# Patient Record
Sex: MALE | Race: OTHER | NOT HISPANIC OR LATINO | URBAN - METROPOLITAN AREA
[De-identification: names, ages, dates, MRNs, and addresses within clinical notes are randomized per-mention and may not be internally consistent; named-entity substitution may affect disease eponyms.]

---

## 2017-05-10 ENCOUNTER — INPATIENT (INPATIENT)
Facility: HOSPITAL | Age: 52
LOS: 0 days | Discharge: LEFT BEFORE DISCHARGE | DRG: 640 | End: 2017-05-11
Payer: MEDICARE

## 2017-05-10 VITALS
SYSTOLIC BLOOD PRESSURE: 108 MMHG | DIASTOLIC BLOOD PRESSURE: 66 MMHG | HEART RATE: 107 BPM | OXYGEN SATURATION: 97 % | TEMPERATURE: 98 F | RESPIRATION RATE: 18 BRPM

## 2017-05-10 DIAGNOSIS — N19 UNSPECIFIED KIDNEY FAILURE: ICD-10-CM

## 2017-05-10 DIAGNOSIS — E11.9 TYPE 2 DIABETES MELLITUS WITHOUT COMPLICATIONS: ICD-10-CM

## 2017-05-10 DIAGNOSIS — R53.1 WEAKNESS: ICD-10-CM

## 2017-05-10 DIAGNOSIS — E87.1 HYPO-OSMOLALITY AND HYPONATREMIA: ICD-10-CM

## 2017-05-10 DIAGNOSIS — K72.90 HEPATIC FAILURE, UNSPECIFIED WITHOUT COMA: ICD-10-CM

## 2017-05-10 DIAGNOSIS — E87.5 HYPERKALEMIA: ICD-10-CM

## 2017-05-10 DIAGNOSIS — E87.2 ACIDOSIS: ICD-10-CM

## 2017-05-10 DIAGNOSIS — N17.9 ACUTE KIDNEY FAILURE, UNSPECIFIED: ICD-10-CM

## 2017-05-10 DIAGNOSIS — R79.89 OTHER SPECIFIED ABNORMAL FINDINGS OF BLOOD CHEMISTRY: ICD-10-CM

## 2017-05-10 LAB
ALBUMIN SERPL ELPH-MCNC: 1.9 G/DL — LOW (ref 3.4–5)
ALP SERPL-CCNC: 206 U/L — HIGH (ref 40–120)
ALP SERPL-CCNC: 208 U/L — HIGH (ref 40–120)
ALP SERPL-CCNC: 215 U/L — HIGH (ref 40–120)
ALT FLD-CCNC: 45 U/L — HIGH (ref 12–42)
ALT FLD-CCNC: 46 U/L — HIGH (ref 12–42)
ALT FLD-CCNC: 50 U/L — HIGH (ref 12–42)
AMMONIA BLD-MCNC: 18 UMOL/L — SIGNIFICANT CHANGE UP (ref 10–32)
AMMONIA BLD-MCNC: 63 UMOL/L — HIGH (ref 10–32)
ANION GAP SERPL CALC-SCNC: 10 MMOL/L — SIGNIFICANT CHANGE UP (ref 9–16)
ANION GAP SERPL CALC-SCNC: 12 MMOL/L — SIGNIFICANT CHANGE UP (ref 9–16)
ANION GAP SERPL CALC-SCNC: 12 MMOL/L — SIGNIFICANT CHANGE UP (ref 9–16)
ANISOCYTOSIS BLD QL: SLIGHT — SIGNIFICANT CHANGE UP
APTT BLD: 44.8 SEC — HIGH (ref 27.5–37.4)
APTT BLD: 48 SEC — HIGH (ref 27.5–37.4)
AST SERPL-CCNC: 177 U/L — HIGH (ref 15–37)
AST SERPL-CCNC: 179 U/L — HIGH (ref 15–37)
AST SERPL-CCNC: 201 U/L — HIGH (ref 15–37)
BASOPHILS NFR BLD AUTO: 0.2 % — SIGNIFICANT CHANGE UP (ref 0–2)
BILIRUB SERPL-MCNC: 13.9 MG/DL — HIGH (ref 0.2–1.2)
BILIRUB SERPL-MCNC: 14.2 MG/DL — HIGH (ref 0.2–1.2)
BILIRUB SERPL-MCNC: 14.9 MG/DL — HIGH (ref 0.2–1.2)
BLD GP AB SCN SERPL QL: NEGATIVE — SIGNIFICANT CHANGE UP
BUN SERPL-MCNC: 90 MG/DL — HIGH (ref 7–23)
BUN SERPL-MCNC: 91 MG/DL — HIGH (ref 7–23)
BUN SERPL-MCNC: 93 MG/DL — HIGH (ref 7–23)
BUN SERPL-MCNC: 95 MG/DL — HIGH (ref 7–23)
CALCIUM SERPL-MCNC: 7.2 MG/DL — LOW (ref 8.5–10.5)
CALCIUM SERPL-MCNC: 7.3 MG/DL — LOW (ref 8.5–10.5)
CALCIUM SERPL-MCNC: 7.4 MG/DL — LOW (ref 8.5–10.5)
CALCIUM SERPL-MCNC: 7.7 MG/DL — LOW (ref 8.5–10.5)
CHLORIDE SERPL-SCNC: 109 MMOL/L — HIGH (ref 96–108)
CHLORIDE SERPL-SCNC: 109 MMOL/L — HIGH (ref 96–108)
CHLORIDE SERPL-SCNC: 110 MMOL/L — HIGH (ref 96–108)
CHLORIDE SERPL-SCNC: 110 MMOL/L — HIGH (ref 96–108)
CK MB CFR SERPL CALC: 4 NG/ML — HIGH (ref 0.5–3.6)
CK SERPL-CCNC: 150 U/L — SIGNIFICANT CHANGE UP (ref 39–308)
CO2 SERPL-SCNC: 10 MMOL/L — CRITICAL LOW (ref 22–31)
CO2 SERPL-SCNC: 10 MMOL/L — CRITICAL LOW (ref 22–31)
CO2 SERPL-SCNC: 12 MMOL/L — LOW (ref 22–31)
CO2 SERPL-SCNC: SIGNIFICANT CHANGE UP MMOL/L (ref 22–31)
CREAT SERPL-MCNC: 2.56 MG/DL — HIGH (ref 0.5–1.3)
CREAT SERPL-MCNC: 2.56 MG/DL — HIGH (ref 0.5–1.3)
CREAT SERPL-MCNC: 2.57 MG/DL — HIGH (ref 0.5–1.3)
CREAT SERPL-MCNC: 2.59 MG/DL — HIGH (ref 0.5–1.3)
EOSINOPHIL NFR BLD AUTO: 1.7 % — SIGNIFICANT CHANGE UP (ref 0–6)
EOSINOPHIL NFR BLD AUTO: 2 % — SIGNIFICANT CHANGE UP (ref 0–6)
ETHANOL SERPL-MCNC: <3 MG/DL — SIGNIFICANT CHANGE UP
GAS PNL BLDV: SIGNIFICANT CHANGE UP
GLUCOSE SERPL-MCNC: 114 MG/DL — HIGH (ref 70–99)
GLUCOSE SERPL-MCNC: 207 MG/DL — HIGH (ref 70–99)
GLUCOSE SERPL-MCNC: 213 MG/DL — HIGH (ref 70–99)
GLUCOSE SERPL-MCNC: 228 MG/DL — HIGH (ref 70–99)
HBV SURFACE AG SER-ACNC: SIGNIFICANT CHANGE UP
HCT VFR BLD CALC: 24.1 % — LOW (ref 39–50)
HCT VFR BLD CALC: 24.2 % — LOW (ref 39–50)
HCV AB S/CO SERPL IA: 13.97 S/CO — SIGNIFICANT CHANGE UP
HCV AB SERPL-IMP: REACTIVE
HGB BLD-MCNC: 8.3 G/DL — LOW (ref 13–17)
HGB BLD-MCNC: 8.3 G/DL — LOW (ref 13–17)
HYPOCHROMIA BLD QL: SLIGHT — SIGNIFICANT CHANGE UP
INR BLD: 2.89 — HIGH (ref 0.88–1.16)
INR BLD: 2.99 — HIGH (ref 0.88–1.16)
LACTATE SERPL-SCNC: 3.1 MMOL/L — HIGH (ref 0.5–2)
LYMPHOCYTES # BLD AUTO: 7 % — LOW (ref 13–44)
LYMPHOCYTES # BLD AUTO: 7 % — LOW (ref 13–44)
MACROCYTES BLD QL: SIGNIFICANT CHANGE UP
MAGNESIUM SERPL-MCNC: 2.1 MG/DL — SIGNIFICANT CHANGE UP (ref 1.6–2.4)
MANUAL DIF COMMENT BLD-IMP: SIGNIFICANT CHANGE UP
MANUAL SMEAR VERIFICATION: SIGNIFICANT CHANGE UP
MCHC RBC-ENTMCNC: 31.1 PG — SIGNIFICANT CHANGE UP (ref 27–34)
MCHC RBC-ENTMCNC: 31.1 PG — SIGNIFICANT CHANGE UP (ref 27–34)
MCHC RBC-ENTMCNC: 34.3 G/DL — SIGNIFICANT CHANGE UP (ref 32–36)
MCHC RBC-ENTMCNC: 34.4 G/DL — SIGNIFICANT CHANGE UP (ref 32–36)
MCV RBC AUTO: 90.3 FL — SIGNIFICANT CHANGE UP (ref 80–100)
MCV RBC AUTO: 90.6 FL — SIGNIFICANT CHANGE UP (ref 80–100)
MONOCYTES NFR BLD AUTO: 12 % — SIGNIFICANT CHANGE UP (ref 2–14)
MONOCYTES NFR BLD AUTO: 12.4 % — SIGNIFICANT CHANGE UP (ref 2–14)
NEUTROPHILS NFR BLD AUTO: 72 % — SIGNIFICANT CHANGE UP (ref 43–77)
NEUTROPHILS NFR BLD AUTO: 72 % — SIGNIFICANT CHANGE UP (ref 43–77)
NEUTROPHILS NFR BLD AUTO: 78.7 % — HIGH (ref 43–77)
NEUTS BAND # BLD: 7 % — SIGNIFICANT CHANGE UP
NT-PROBNP SERPL-SCNC: 861 PG/ML — HIGH
OVALOCYTES BLD QL SMEAR: SLIGHT — SIGNIFICANT CHANGE UP
PLAT MORPH BLD: (no result)
PLATELET # BLD AUTO: 123 K/UL — LOW (ref 150–400)
PLATELET # BLD AUTO: 124 K/UL — LOW (ref 150–400)
POIKILOCYTOSIS BLD QL AUTO: SLIGHT — SIGNIFICANT CHANGE UP
POLYCHROMASIA BLD QL SMEAR: SLIGHT — SIGNIFICANT CHANGE UP
POTASSIUM SERPL-MCNC: 7.1 MMOL/L — CRITICAL HIGH (ref 3.5–5.3)
POTASSIUM SERPL-MCNC: 7.2 MMOL/L — CRITICAL HIGH (ref 3.5–5.3)
POTASSIUM SERPL-MCNC: 7.4 MMOL/L — CRITICAL HIGH (ref 3.5–5.3)
POTASSIUM SERPL-MCNC: SIGNIFICANT CHANGE UP MMOL/L (ref 3.5–5.3)
POTASSIUM SERPL-SCNC: 7.1 MMOL/L — CRITICAL HIGH (ref 3.5–5.3)
POTASSIUM SERPL-SCNC: 7.2 MMOL/L — CRITICAL HIGH (ref 3.5–5.3)
POTASSIUM SERPL-SCNC: 7.4 MMOL/L — CRITICAL HIGH (ref 3.5–5.3)
POTASSIUM SERPL-SCNC: SIGNIFICANT CHANGE UP MMOL/L (ref 3.5–5.3)
PROT SERPL-MCNC: 5.5 G/DL — LOW (ref 6.4–8.2)
PROT SERPL-MCNC: 5.7 G/DL — LOW (ref 6.4–8.2)
PROT SERPL-MCNC: 6 G/DL — LOW (ref 6.4–8.2)
PROTHROM AB SERPL-ACNC: 32.8 SEC — HIGH (ref 9.8–12.7)
PROTHROM AB SERPL-ACNC: 33.9 SEC — HIGH (ref 9.8–12.7)
RBC # BLD: 2.67 M/UL — LOW (ref 4.2–5.8)
RBC # BLD: 2.67 M/UL — LOW (ref 4.2–5.8)
RBC # FLD: 21.3 % — HIGH (ref 10.3–16.9)
RBC # FLD: 21.7 % — HIGH (ref 10.3–16.9)
RBC BLD AUTO: (no result)
RH IG SCN BLD-IMP: POSITIVE — SIGNIFICANT CHANGE UP
SCHISTOCYTES BLD QL AUTO: SIGNIFICANT CHANGE UP
SODIUM SERPL-SCNC: 128 MMOL/L — LOW (ref 135–145)
SODIUM SERPL-SCNC: 131 MMOL/L — LOW (ref 135–145)
SODIUM SERPL-SCNC: 132 MMOL/L — LOW (ref 135–145)
SODIUM SERPL-SCNC: 132 MMOL/L — LOW (ref 135–145)
SPHEROCYTES BLD QL SMEAR: SLIGHT — SIGNIFICANT CHANGE UP
TROPONIN I SERPL-MCNC: <0.015 NG/ML — SIGNIFICANT CHANGE UP (ref 0.01–0.04)
WBC # BLD: 16 K/UL — HIGH (ref 3.8–10.5)
WBC # BLD: 16.7 K/UL — HIGH (ref 3.8–10.5)
WBC # FLD AUTO: 16 K/UL — HIGH (ref 3.8–10.5)
WBC # FLD AUTO: 16.7 K/UL — HIGH (ref 3.8–10.5)

## 2017-05-10 PROCEDURE — 71010: CPT | Mod: 26,GV

## 2017-05-10 PROCEDURE — 99291 CRITICAL CARE FIRST HOUR: CPT | Mod: 25

## 2017-05-10 PROCEDURE — 93010 ELECTROCARDIOGRAM REPORT: CPT

## 2017-05-10 RX ORDER — CALCIUM GLUCONATE 100 MG/ML
1 VIAL (ML) INTRAVENOUS ONCE
Qty: 0 | Refills: 0 | Status: COMPLETED | OUTPATIENT
Start: 2017-05-10 | End: 2017-05-10

## 2017-05-10 RX ORDER — ALBUTEROL 90 UG/1
2.5 AEROSOL, METERED ORAL ONCE
Qty: 0 | Refills: 0 | Status: COMPLETED | OUTPATIENT
Start: 2017-05-10 | End: 2017-05-11

## 2017-05-10 RX ORDER — DEXTROSE 50 % IN WATER 50 %
50 SYRINGE (ML) INTRAVENOUS ONCE
Qty: 0 | Refills: 0 | Status: COMPLETED | OUTPATIENT
Start: 2017-05-10 | End: 2017-05-10

## 2017-05-10 RX ORDER — LACTULOSE 10 G/15ML
15 SOLUTION ORAL
Qty: 0 | Refills: 0 | Status: DISCONTINUED | OUTPATIENT
Start: 2017-05-10 | End: 2017-05-11

## 2017-05-10 RX ORDER — INSULIN HUMAN 100 [IU]/ML
10 INJECTION, SOLUTION SUBCUTANEOUS ONCE
Qty: 0 | Refills: 0 | Status: COMPLETED | OUTPATIENT
Start: 2017-05-10 | End: 2017-05-10

## 2017-05-10 RX ORDER — SODIUM CHLORIDE 9 MG/ML
1000 INJECTION, SOLUTION INTRAVENOUS
Qty: 0 | Refills: 0 | Status: DISCONTINUED | OUTPATIENT
Start: 2017-05-10 | End: 2017-05-10

## 2017-05-10 RX ORDER — FUROSEMIDE 40 MG
40 TABLET ORAL ONCE
Qty: 0 | Refills: 0 | Status: COMPLETED | OUTPATIENT
Start: 2017-05-10 | End: 2017-05-10

## 2017-05-10 RX ORDER — SODIUM CHLORIDE 9 MG/ML
500 INJECTION INTRAMUSCULAR; INTRAVENOUS; SUBCUTANEOUS ONCE
Qty: 0 | Refills: 0 | Status: COMPLETED | OUTPATIENT
Start: 2017-05-10 | End: 2017-05-10

## 2017-05-10 RX ORDER — SODIUM CHLORIDE 9 MG/ML
1000 INJECTION, SOLUTION INTRAVENOUS
Qty: 0 | Refills: 0 | Status: DISCONTINUED | OUTPATIENT
Start: 2017-05-10 | End: 2017-05-11

## 2017-05-10 RX ORDER — SODIUM POLYSTYRENE SULFONATE 4.1 MEQ/G
30 POWDER, FOR SUSPENSION ORAL ONCE
Qty: 0 | Refills: 0 | Status: COMPLETED | OUTPATIENT
Start: 2017-05-10 | End: 2017-05-10

## 2017-05-10 RX ORDER — ALBUTEROL 90 UG/1
2.5 AEROSOL, METERED ORAL
Qty: 0 | Refills: 0 | Status: COMPLETED | OUTPATIENT
Start: 2017-05-10 | End: 2017-05-10

## 2017-05-10 RX ORDER — SODIUM CHLORIDE 9 MG/ML
500 INJECTION, SOLUTION INTRAVENOUS
Qty: 0 | Refills: 0 | Status: COMPLETED | OUTPATIENT
Start: 2017-05-10 | End: 2017-05-11

## 2017-05-10 RX ADMIN — SODIUM CHLORIDE 500 MILLILITER(S): 9 INJECTION INTRAMUSCULAR; INTRAVENOUS; SUBCUTANEOUS at 17:48

## 2017-05-10 RX ADMIN — Medication 50 MILLILITER(S): at 18:26

## 2017-05-10 RX ADMIN — SODIUM POLYSTYRENE SULFONATE 30 GRAM(S): 4.1 POWDER, FOR SUSPENSION ORAL at 18:50

## 2017-05-10 RX ADMIN — SODIUM POLYSTYRENE SULFONATE 30 GRAM(S): 4.1 POWDER, FOR SUSPENSION ORAL at 22:20

## 2017-05-10 RX ADMIN — INSULIN HUMAN 10 UNIT(S): 100 INJECTION, SOLUTION SUBCUTANEOUS at 18:26

## 2017-05-10 RX ADMIN — ALBUTEROL 2.5 MILLIGRAM(S): 90 AEROSOL, METERED ORAL at 18:26

## 2017-05-10 RX ADMIN — INSULIN HUMAN 10 UNIT(S): 100 INJECTION, SOLUTION SUBCUTANEOUS at 23:19

## 2017-05-10 RX ADMIN — LACTULOSE 15 GRAM(S): 10 SOLUTION ORAL at 23:19

## 2017-05-10 RX ADMIN — Medication 200 GRAM(S): at 22:30

## 2017-05-10 RX ADMIN — Medication 50 MILLILITER(S): at 23:21

## 2017-05-10 RX ADMIN — ALBUTEROL 2.5 MILLIGRAM(S): 90 AEROSOL, METERED ORAL at 18:32

## 2017-05-10 NOTE — H&P ADULT - PROBLEM SELECTOR PROBLEM 6
Type 2 diabetes mellitus with complication, with long-term current use of insulin Anemia, unspecified type Bright red blood per rectum

## 2017-05-10 NOTE — H&P ADULT - PROBLEM SELECTOR PROBLEM 7
Weakness Type 2 diabetes mellitus with complication, with long-term current use of insulin Anemia, unspecified type

## 2017-05-10 NOTE — CONSULT NOTE ADULT - PROBLEM SELECTOR RECOMMENDATION 6
Unclear Baseline Creatinine. Patient Reports Normal Urinary Frequency and Amount. Unclear Whether Or Not Patient with Hepatorenal Syndrome At This Time.   -Check Urine Creatinine, BUN, Na.   -Monitor BUN/Creatinine Closely.   -Monitor Strict I/Os.  -Obtain Collateral Information from Physicians in [place].

## 2017-05-10 NOTE — H&P ADULT - HISTORY OF PRESENT ILLNESS
51M PMHx of hepatitis C cirrhosis, 51M PMHx of hepatitis C cirrhosis s/p TIPS 1 month ago (s/p Hep C treatment), HCC, DMII recently arranged to be home with hospice care presents to [facility] after collapse. Patient travelled to [place] to renew his visa and while walking in the street he started feeling very weak, dizzy, light headed and had to fall to the ground. No loss of consciousness. seizure activity, palpitations, chest pain. He had been in his usual state of health prior to the episode and denies any recent illness, fever/chills, URI symptoms, medication changes. Takes lactulose daily with daily BMs. Denies any known history of kidney disease.     Patient repeatedly asking to go home to [place] where his family lives. Has been on home hospice for 1 week and understands these implications. Would like to be treated fully here in order to allow him to get back home. Reluctant to dialysis, but amenable if he fails medical management     In the ED: Vitals T 98, -107, -127/51-57, RR 16-18, Sat 97-99. Found to be hyperkalemic and given albuterol neb, insulin 10 units + D50%, kayexalate. ICU was consulted and patient admitted to [facility] 51M PMHx of hepatitis C cirrhosis s/p TIPS 1 month ago (s/p Hep C treatment), HCC, DMII, AS s/p TAVR recently arranged to be home with hospice care presents to [facility] after collapse. Patient travelled to [place] to renew his visa and while walking in the street he started feeling very weak, dizzy, light headed and had to fall to the ground. No loss of consciousness. seizure activity, palpitations, chest pain. He had been in his usual state of health prior to the episode and denies any recent illness, fever/chills, URI symptoms, medication changes. Takes lactulose daily with daily BMs. Denies any known history of kidney disease.     Patient repeatedly asking to go home to [place] where his family lives. Has been on home hospice for 1 week and understands these implications. Would like to be treated fully here in order to allow him to get back home. Reluctant to dialysis, but amenable if he fails medical management     In the ED: Vitals T 98, -107, -127/51-57, RR 16-18, Sat 97-99. Found to be hyperkalemic and given albuterol neb, insulin 10 units + D50%, kayexalate. ICU was consulted and patient admitted to [facility]

## 2017-05-10 NOTE — ED PROVIDER NOTE - OBJECTIVE STATEMENT
51y male h/o liver disease (not sure why, states "in [place], everyone has this", on list for transplant)/cirrhosis on lactulose, DM, hernia, states [place] when he felt short of breath and dizzy, so he went to the ground. States he usually does not walk excessively like today bc this happens to him. No LOC/syncope. No fever, chills, cough, abd pain, dysuria. PMD [facility] in [place]. No EtOH use.

## 2017-05-10 NOTE — H&P ADULT - PROBLEM SELECTOR PLAN 1
K 7.4 on presentation K 7.4 on presentation likely secondary to acute renal failure and acidosis. Possible component of hemolysis. S/p insulin, albuterol, PO Kayexalate in ED. EKG changes with ME prolongation, but no QT changes.  - Renal consulted for possible emergent dialysis and help with K management  - Lasix 40mg IV and continue with lasix if patient is making adequate urine  - Not tolerating kayexalate PO with N/V, so will give Kayexalate Enema  - Trend BMP and EKG  - If lasix and enema do not have effect, will discuss dialysis with nephrology

## 2017-05-10 NOTE — ED PROVIDER NOTE - MEDICAL DECISION MAKING DETAILS
51y male with pre-syncope episode which he relates to overexertion. Currently feels well and has no complaints. Visibly jaundice on exam. 51y male with pre-syncope episode which he relates to overexertion. Currently feels well and has no complaints. Visibly jaundice on exam. No evidence for SBP or encephalopathy at this time. Plan for labs, EKG, reassess and admit.

## 2017-05-10 NOTE — ED PROVIDER NOTE - PROGRESS NOTE DETAILS
Labs noted and resent. Second set reviewed. K 7.4 (not hemolyzed), renal insuff, abnormal LFTs (unknown baseline). Hyper-K meds ordered, abd US ordered, d/w ICU for consult. Labs noted and resent. Second set reviewed. K 7.4 (not hemolyzed), renal insuff, abnormal LFTs (unknown baseline). Hyper-K meds ordered, levaquin ordered 2/2 CXR read, abd US ordered, d/w ICU for consult. Pt seen and evaluated by ICU team - recommend admission to stepdown unit.

## 2017-05-10 NOTE — ED PROVIDER NOTE - CRITICAL CARE PROVIDED
consultation with other physicians/direct patient care (not related to procedure)/documentation/additional history taking

## 2017-05-10 NOTE — H&P ADULT - PROBLEM SELECTOR PLAN 3
Unclear cause of ARF. Sepsis vs. hepatorenal syndrome. Unknown baseline Cr, but patient denies history of kidney problems.  - Renal consulted, f/u recs  - BUN 95, Cr 2.57 unknown baseline  - Check urine Na, urea, Cr  - f/u Urinalysis  - responding appropriately to lasix

## 2017-05-10 NOTE — H&P ADULT - NSHPPHYSICALEXAM_GEN_ALL_CORE
.  VITAL SIGNS:  T(C): 36.3, Max: 36.7 (05-10 @ 15:14)  T(F): 97.4, Max: 98 (05-10 @ 15:14)  HR: 104 (100 - 107)  BP: 124/58 (105/51 - 127/57)  BP(mean): 93 (82 - 93)  RR: 17 (16 - 18)  SpO2: 100% (97% - 100%)  Wt(kg): --    PHYSICAL EXAM:    Constitutional: appears nauseous, jaundice, distended  Head: NC/AT  Eyes: PERRL, EOMI, scleral icterus  ENT: no nasal discharge; uvula midline, no oropharyngeal erythema or exudates; MMM  Neck: supple; no JVD or thyromegaly  Respiratory: decreased BS with crackles at the bases b/l without wheezes  Cardiac: +S1/S2; tachycardic, harsh systolic murmur at R sternal border  Gastrointestinal: significant distention, dull to percussion, minimal TTP, abdominal hernias  Back: spine midline, no bony tenderness or step-offs; no CVAT B/L  Extremities: cool extremities, 3+ pitting edema with skin thinning/rippling  Musculoskeletal: NROM x4; no joint swelling, tenderness or erythema  Vascular: 2+ radial, femoral, DP/PT pulses B/L  Dermatologic: skin warm, dry and intact; no rashes, wounds, or scars. Jaundice  Lymphatic: no submandibular or cervical LAD  Neurologic: AAOx3; CNII-XII grossly intact; no focal deficits  Psychiatric: affect and characteristics of appearance, verbalizations, behaviors are appropriate

## 2017-05-10 NOTE — CONSULT NOTE ADULT - PROBLEM SELECTOR RECOMMENDATION 4
Hyponatremia likely Hypotonic Hypervolemic Hyponatremia in Setting of Cirrhosis.   -Check Serum Osm, Urine Osm, and Urina Na.   -Monitor Na Levels.

## 2017-05-10 NOTE — H&P ADULT - PROBLEM SELECTOR PLAN 5
HCO3 10 with elevated lactic acid, BUN and chloride levels concerning for mixed AGMA/NAGMA. May be component of RTA as well.   - HCO3 gtt 200cc/hr  - f/u UA and urine pH  - need to correct ARF to lower BUN and Cl levels. Lactic acid will clear slowly.

## 2017-05-10 NOTE — H&P ADULT - PROBLEM SELECTOR PLAN 4
Na 132, INR 2.99, Albumin 1.9, Tbili 13.9 --> MELD score of 38. Decreased synthetic function with sequelae of thrombocytopenia, coagulopathy, anasarca. Patient home with hospice care. Previously on transplant list.  - C/w lactulose daily  - hold rifaximin while treating with IV Abx  - no aldactone in setting of hyperkalemia, lasix as above

## 2017-05-10 NOTE — CONSULT NOTE ADULT - PROBLEM SELECTOR RECOMMENDATION 9
Patient reports Dizziness, Weakness, Reporting Bringing Self To Ground. EMS Called, and Brought To [facility]. Patient on Home Hospice for Liver Failure due to Hepatitis C Cirrhosis and Hepatocellular Carcinoma. Etiology of Weakness Likely Related to Progression of Underlying Liver Disease. However, Also, with Leukocytosis, Tachycardia, Lactic Acidosis and Possible Sources of Infection, So Also Potentially with Severe Sepsis.   -As per HPI, No Symptoms To Localize Source of Infection. However, with Possible Right Lower Lobe Infiltrate on CXR. Given Levofloxacin 750mg IV x1 in the ED. Patient Recently Had TIPS Procedure, Reporting 1 Month Ago, So Has Been Hospitalized. Would Start Vancomycin and Zosyn (Renally Dosed) To Cover for HAP.   -In Addition, Zosyn Would Cover for SBP, Although Patient Not Exhibiting Signs or Symptoms of SBP.   -No Urinary Symptoms Report, Can Check UA and if Positive Send for Urine Culture.   -Weakness More Likely Related To Progression of Liver Disease.   -Would Check Ammonia Level, Restart Lactulose and Rifaximin. Titrate Lactulose to 3-4 Bowel Movements Daily.
Patient is a 51 year old male with acute renal failure. Could be pre-renal in nature due to renal hypoperfusion/dehydration. Could also be due to hepatorenal syndrome (unknown baseline renal function). Could also be due to Acute GN though less likely on the differential. Would also recommend to rule out obstructive nephropathy.    P - please check UA and urine lytes   Please monitor strict I/Os   Please get an ultrasound retroperitoneal with pre-post void bladder views

## 2017-05-10 NOTE — ED PROVIDER NOTE - CARE PLAN
Principal Discharge DX:	Hyperkalemia  Secondary Diagnosis:	Cirrhosis of liver with ascites, unspecified hepatic cirrhosis type  Secondary Diagnosis:	Renal insufficiency

## 2017-05-10 NOTE — ED ADULT NURSE NOTE - CHIEF COMPLAINT QUOTE
patient reports dizziness, shortness of breath and chest discomfort. Patient's abdomen appears distended.

## 2017-05-10 NOTE — PROVIDER CONTACT NOTE (CRITICAL VALUE NOTIFICATION) - TEST AND RESULT REPORTED:
ph 7.13, CO2 23, O2 134, Bicarb 8, Base excess -19.7, Na 126, K 7.0, Ionized calcium 1.09
K 7.2 CO2- 10

## 2017-05-10 NOTE — H&P ADULT - NSHPREVIEWOFSYSTEMS_GEN_ALL_CORE
Negative for fever/chills, diarrhea/constipation, headache, vision/hearing changes, sore throat, dyspnea, chest pain, dysuria   Positive for nausea/vomiting, dizziness, abdominal distention, LE edema

## 2017-05-10 NOTE — CONSULT NOTE ADULT - SUBJECTIVE AND OBJECTIVE BOX
Patient is a 51y Male for whom nephrology was consulted for acute renal failure, hyperkalemia, and metabolic acidosis. Patient states he is visiting from [place] where he lives with his family. Patient sates he was in [place] to renew his Visa at the [employer]. While he was there, he felt light headed and lowered himself to the ground. Patient denies any fevers, chills, vomiting, changes in appetite, headaches, Patient denies any abdominal pain but does have a distended abdomen which has not worsened. Patient has a history of Hepatitis C and liver cirrhosis and hepatocellular carcinoma. Patient underwent TIPS one month ago. Patient was recently placed on home hospice, however, patient states that now he wants to be full code and have "everything done."    PAST MEDICAL & SURGICAL HISTORY:  Hernia  Cirrhosis  Hepatitis C  DM type 2 (diabetes mellitus, type 2)    MEDICATIONS  (STANDING):  dextrose 50% Injectable 50milliLiter(s) IV Push Once  insulin regular  human recombinant. 10Unit(s) IV Push once  ALBUTerol    0.083%. 2.5milliGRAM(s) Nebulizer once  lactulose Syrup 15Gram(s) Oral two times a day  dextrose 5% 1000milliLiter(s) IV Continuous <Continuous>    MEDICATIONS  (PRN):    Allergies    No Known Allergies    Intolerances    SOCIAL HISTORY: Never smoker, denies alcohol use, denies illicit drug use    T(C): , Max: 36.7 (05-10 @ 15:14)  T(F): , Max: 98 (05-10 @ 15:14)  HR: 106  BP: 119/52  BP(mean): 82  RR: 18  SpO2: 100%    LABS:                        8.3    16.0  )-----------( 123      ( 10 May 2017 17:05 )             24.2     05-10    132<L>  |  110<H>  |  95<H>  ----------------------------<  114<H>  7.1<HH>   |  10<LL>  |  2.57<H>    Ca    7.3<L>      10 May 2017 21:21  Mg     2.1     05-10    TPro  5.5<L>  /  Alb  1.9<L>  /  TBili  13.9<H>  /  DBili  x   /  AST  177<H>  /  ALT  45<H>  /  AlkPhos  208<H>  05-10    Hepatitis C Virus S/CO Ratio: 13.97 S/CO (05-10 @ 17:05)  Hepatitis C Virus Interpretation: Reactive <!!> (05-10 @ 17:05)    PT/INR - ( 10 May 2017 17:05 )   PT: 33.9 sec;   INR: 2.99       PTT - ( 10 May 2017 17:05 )  PTT:48.0 sec

## 2017-05-10 NOTE — H&P ADULT - ASSESSMENT
51M PMHx of hepatitis C cirrhosis s/p TIPS 1 month ago (s/p Hep C treatment), HCC, DMII recently arranged to be home with hospice care presents to [facility] after collapse. Concern for decompensated liver failure, acute renal failure, hyperkalemia. Likely all secondary to worsening HCC and cirrhosis vs. infection. 51M PMHx of hepatitis C cirrhosis s/p TIPS 1 month ago (s/p Hep C treatment), HCC, DMII, AS s/p TAVR recently arranged to be home with hospice care presents to [facility] after collapse. Concern for decompensated liver failure, acute renal failure, hyperkalemia. Likely all secondary to worsening HCC and cirrhosis vs. infection.

## 2017-05-10 NOTE — H&P ADULT - PROBLEM SELECTOR PLAN 7
Patient on long acting and premeal insulin at home. Finger sticks well controlled currently.  - ISS while patient has N/V and decreased PO intake Likely ACD, unknown baseline. Concern for hemolysis with hyperkalemia.  - check Fe studies, B12, folate in AM  - low haptoglobin, elevated LDH and bilirubin concerning for hemolysis. Occasional schistocytes on manual smear. Concern for DIC in setting of hepatic failure.   - hemorrhoidal bleed may be contributing  - maintain active T+S

## 2017-05-10 NOTE — CONSULT NOTE ADULT - PROBLEM SELECTOR RECOMMENDATION 5
Metabolic Acidosis Likely Related to Lactic Acidosis, and Renal Failure.   -Unclear Etiology of Lactic Acidosis, Patient Warm, Well Perfused, Vital Signs Stable, Possibly Unable to Clear Effectively in Setting of Liver Failure.   -Unclear Baseline Creatinine.

## 2017-05-10 NOTE — CONSULT NOTE ADULT - PROBLEM SELECTOR RECOMMENDATION 8
Blood Glucose Elevated, Patient on Insulin At Home.   -Start Insulin Lispro Medium Dose Correctional Scale Before Meals and at Bedtime.   -Consider Restarting Home Levemir and Pre-Meal Novolog.   -Monitor Blood Glucose and Adjust Accordingly.     Disposition: [facility]  CODE STATUS: FULL CODE, Despite Being on Home Hospice, Understanding What Hospice Is, and Acknowledging That He Wants to Go Home to [place] to Live Out What Time He Has Left with his Family.     Discussed with Critical Care Attending.

## 2017-05-10 NOTE — H&P ADULT - PROBLEM SELECTOR PROBLEM 8
Prophylactic measure Type 2 diabetes mellitus with complication, with long-term current use of insulin

## 2017-05-10 NOTE — CONSULT NOTE ADULT - PROBLEM SELECTOR RECOMMENDATION 7
Patient with Elevated Transaminases, Bilirubin. Unknown Baseline. Patient reports Persistent Jaundice Unchanged, So Likely Bilirubin Has Been Elevated. Likely Secondary to Compression of Small Intrahepatic Bile Ducts from Patient Reports Growing Hepatocellular Carcinoma. Also with Decreased Albumin, Elevated PT/INR, PTT.   -Obtain Collateral Information from Physicians in [place].

## 2017-05-10 NOTE — H&P ADULT - PROBLEM SELECTOR PLAN 8
hold heparin in setting of thrombocytopenia Patient on long acting and premeal insulin at home. Finger sticks well controlled currently.  - ISS while patient has N/V and decreased PO intake

## 2017-05-10 NOTE — H&P ADULT - PROBLEM SELECTOR PLAN 9
F: D5 3 amps HCO3 at 200cc/hr  E: as above  N: renal restricted diet hold heparin in setting of thrombocytopenia

## 2017-05-10 NOTE — H&P ADULT - PROBLEM SELECTOR PLAN 6
Likely ACD, unknown baseline. Concern for hemolysis with hyperkalemia.  - check Fe studies, B12, folate in AM  - low haptoglobin, elevated LDH and bilirubin concerning for hemolysis. Occasional schistocytes on manual smear. Concern for DIC in setting of hepatic failure.   - maintain active T+S Patient had BM with brown stool with 2-3 tablespoons of BRB likely secondary to hemorrhoids from cirrhosis. Does not appear to be significant source of bleed  - continue to monitor  - consider GI consult if continues

## 2017-05-10 NOTE — CONSULT NOTE ADULT - PROBLEM SELECTOR RECOMMENDATION 3
Potassium 7.4, Repeated 7.2. Patient received Kayexalate, Regular Insulin, D50, and Albuterol Nebulizer in ED.   -Repeat Potassium, Attempt To Bring Potassium Into Normal Range.   -Repeat EKG in AM.
Patient noted to have non-anion gap metabolic acidosis.     P - give bolus of 500 cc D5W with 150 mEq of sodium bicarbonate and continue drip at 200 cc/hr   Monitor electrolytes closely   Consider ABG to better assess acid-base status

## 2017-05-10 NOTE — H&P ADULT - PROBLEM SELECTOR PLAN 2
Acute onset of weakness with leukocytosis, tachycardia, renal failure, lactic acidosis concerning for sepsis. Possible PNA on CXR. Abdominal exam not consistent with SBP and on rifaximin at home. Will cover broadly.  - Vancomycin 1g and check level in 24hrs  - Zosyn 2.25g q6h for renally dosed coverage of HCAP  - Anasarca limiting fluid resucistation, on D5 w/ 3 amps Bicarb at 200cc/hr and will monitor respiratory status closely  - Lactic Acid 3.1 on repeat. Decreased hepatic clearance, no need to trend further

## 2017-05-10 NOTE — ED ADULT TRIAGE NOTE - CHIEF COMPLAINT QUOTE
patient reports dizziness, shortness of breath and chest discomfort patient reports dizziness, shortness of breath and chest discomfort. Patient's abdomen appears distended.

## 2017-05-10 NOTE — CONSULT NOTE ADULT - PROBLEM SELECTOR RECOMMENDATION 2
Patient with Liver Failure due to Hepatitis C Cirrhosis and Hepatocellular Carcinoma. Patient reporting Started Home Hospice 1 Week Ago.   -Continue Lactulose and Rifaximin as Above.
Patient presented with hyperkalemia of 7.4 from unclear etiology. CK normal so unlikely to be due to rhabdomyolysis. Could be due to hemolysis.     P - Please give patient a 500cc bolus of D5W with 150 mEq of sodium bicarbonate   Then continue with D5W with 150 mEq sodium bicarbonate at 200 cc/hr   please give patient furosemide 40mg IV stat   Give calcium gluconate 1 gm stat  Insulin regular 10 units with dextrose   Albuterol nebulizer   Give Kayexelate 30 gm stat   Please monitor patient's urine output over the next 2 hours   recheck electrolytes in 2 hours   if no improvement in potassium, then may require emergent hemodialysis. Patient is reluctant for hemodialysis, and prefers medical management first.

## 2017-05-10 NOTE — CONSULT NOTE ADULT - SUBJECTIVE AND OBJECTIVE BOX
ICU Consult Medicine Resident Note    Patient is a 51 year old male with past medical  history of       PMHx:  PSHx:  Medications:  Allergies:  SocialHx:  FamilyHx:    Physical Examination  Vital Signs Last 24 Hrs  T(C): 36.7, Max: 36.7 (05-10 @ 15:14)  T(F): 98, Max: 98 (05-10 @ 15:14)  HR: 102 (100 - 107)  BP: 127/57 (105/51 - 127/57)  BP(mean): --  RR: 16 (16 - 18)  SpO2: 99% (97% - 99%)    General:   HEENT:  Neck:  Heart:  Lungs:  Abdomen:  Extremities:  Neurological:  Skin:    Labs/Imaging: ICU Consult Medicine Resident Note    Patient is a 51 year old male with past medical  history of Hepatitis C Liver Cirrhosis (Reports Hepatitis C Treated, However Liver Failure Progressed, Requiring TIPS 1 Month Ago, Now With Worsening Hepatocellular Carcinoma), Type 2 Diabetes, recently Placed on Home Hospice, presenting to [facility] After Collapsing.     Patient reports he is Visiting from [place] where he Lives with his Wife and 3 Children. Patient reports he Traveled Alone by Bus to [facility] [place] Visa to Stay in [place]. Patient reports Earlier Today, he was Walking in [place], and he Became "Dizzy", "Body Felt Heavy", and he Lowered Him Self To Ground. Passers By Called, Called EMS, and Patient Brought to [facility].      Patient denies Fever, Chills. Patient reports Nausea, but No Vomiting. Patient reports Currently Hungry, Has Appetite. Patient denies Headache, Vision Changes, Hearing Changes, Neck Pain/Stiff, Cough, Sinus Congestion, Sore Throat, Chest Pain. Patient reports Difficult To Take Deep Breath Due to Abdominal Girth in Setting of Ascites, However, Unchanged From Baseline. Patient denies Palpitations. Patient denies Abdominal Pain. Patient reports Abdominal Distention/Ascites is At Baseline, Not Worsened. Patient reports Multiple Dark Green Bowel Movements Daily, Which Also Is Baseline, and From Lactulose. Patient denies Dysuria, Hematuria, or Changes in Urinary Frequency. Patient reports Lower Extremity Edema, However, Also, Not Worse Than Baseline.     Patient reports he has been on Home Hospice for 1 Week. Patient reports Understand What Home Hospice Is, Repeating That It Is "For People Who Are Going To Die". Patient Stating Numerous Times He Wishes To Go Home to [place] to Be With His Family.     In the ED, Vitals were TMax 98, -107, -127/51-57, RR 16-18, SpO2 97-99% on RA. Labs With Multiple Abnormalities Which Can Be Reviewed In EMR. For Hyperkalemia, Patient Given Kayexalate 30g PO x 1, Regular Insulin 10units IV x 1, D50 1 Ampule IV x 1, and Albuterol Nebulizer. CXR Concerning for Possible Right Lower Lobe Infiltrate, Given Levaquin 750mg IV x 1. Also, Given NS 500mL Bolus x 1.        PMHx: Hepatitis C Cirrhosis, Hepatocellular Carcinoma, Type 2 Diabetes  PSHx: TIPS Procedure, Right Inguinal Hernia Repair  Medications: Lactulose, Rifaximin, Levemir 20 units SQ at Bedtime, and Novolog 14 units SQ Pre-Meal  Allergies: None  SocialHx: Never Smoker, Denies ETOH, Denies Drugs, Worked As [employer]    Physical Examination  Vital Signs Last 24 Hrs  T(C): 36.7, Max: 36.7 (05-10 @ 15:14)  T(F): 98, Max: 98 (05-10 @ 15:14)  HR: 102 (100 - 107)  BP: 127/57 (105/51 - 127/57)  BP(mean): --  RR: 16 (16 - 18)  SpO2: 99% (97% - 99%)    General: Cachectic Male, No Acute Distress, Pleasant  HEENT: Bilateral Temporal Wasting, PERRLA B/L, EOMI B/L, + Scleral Icterus, Moist Mucous Membranes  Neck: Supple, No Cervical or Supraclavicular Lymphadenopathy , + JVD, + Hepatojugular Reflex, No Nuchal Rigity.  Heart: Normal S1+S2, Tachycardic,   Lungs:  Abdomen:  Extremities:  Neurological:  Skin:    Labs/Imaging: ICU Consult Medicine Resident Note    Patient is a 51 year old male with past medical  history of Hepatitis C Liver Cirrhosis (Reports Hepatitis C Treated, However Liver Failure Progressed, Requiring TIPS 1 Month Ago, Now With Worsening Hepatocellular Carcinoma), Type 2 Diabetes, recently Placed on Home Hospice, presenting to [facility] After Collapsing.     Patient reports he is Visiting from [place] where he Lives with his Wife and 3 Children. Patient reports he Traveled Alone by [place] [facility] [place] [facility] [place] to Stay in [place]. Patient reports Earlier Today, he was Walking in [place], and he Became "Dizzy", "Body Felt Heavy", and he Lowered Him Self To Ground. Passers By Called, Called EMS, and Patient Brought to [facility].      Patient denies Fever, Chills. Patient reports Nausea, but No Vomiting. Patient reports Currently Hungry, Has Appetite. Patient denies Headache, Vision Changes, Hearing Changes, Neck Pain/Stiff, Cough, Sinus Congestion, Sore Throat, Chest Pain. Patient reports Difficult To Take Deep Breath Due to Abdominal Girth in Setting of Ascites, However, Unchanged From Baseline. Patient denies Palpitations. Patient denies Abdominal Pain. Patient reports Abdominal Distention/Ascites is At Baseline, Not Worsened. Patient reports Multiple Dark Green Bowel Movements Daily, Which Also Is Baseline, and From Lactulose. Patient denies Dysuria, Hematuria, or Changes in Urinary Frequency. Patient reports Lower Extremity Edema, However, Also, Not Worse Than Baseline.     Patient reports he has been on Home Hospice for 1 Week. Patient reports Understand What Home Hospice Is, Repeating That It Is "For People Who Are Going To Die". Patient Stating Numerous Times He Wishes To Go Home to [place] to Be With His Family.     In the ED, Vitals were TMax 98, -107, -127/51-57, RR 16-18, SpO2 97-99% on RA. Labs With Multiple Abnormalities Which Can Be Reviewed In EMR. For Hyperkalemia, Patient Given Kayexalate 30g PO x 1, Regular Insulin 10units IV x 1, D50 1 Ampule IV x 1, and Albuterol Nebulizer. CXR Concerning for Possible Right Lower Lobe Infiltrate, Given Levaquin 750mg IV x 1. Also, Given NS 500mL Bolus x 1.        PMHx: Hepatitis C Cirrhosis, Hepatocellular Carcinoma, Type 2 Diabetes  PSHx: TIPS Procedure, Right Inguinal Hernia Repair  Medications: Lactulose, Rifaximin, Levemir 20 units SQ at Bedtime, and Novolog 14 units SQ Pre-Meal  Allergies: None  SocialHx: Never Smoker, Denies ETOH, Denies Drugs, Worked As [employer]    Physical Examination  Vital Signs Last 24 Hrs  T(C): 36.7, Max: 36.7 (05-10 @ 15:14)  T(F): 98, Max: 98 (05-10 @ 15:14)  HR: 102 (100 - 107)  BP: 127/57 (105/51 - 127/57)  BP(mean): --  RR: 16 (16 - 18)  SpO2: 99% (97% - 99%)    General: Cachectic Male, No Acute Distress, Pleasant  HEENT: Bilateral Temporal Wasting, PERRLA B/L, EOMI B/L, + Scleral Icterus, Moist Mucous Membranes  Neck: Supple, No Cervical or Supraclavicular Lymphadenopathy , + JVD, + Hepatojugular Reflex, No Nuchal Rigity.  Heart: Normal S1+S2, Tachycardic, Regular Rhythm, 3/6 Systolic Murmur Right 2nd ICS, No Rubs, No Gallops  Lungs: Coarse Crackles Bilaterally to Mid Lung Fields, No Wheezes, No Rhonchi, No Egophony.   Abdomen: Soft, Markedly Distended, Tympanic To Percussion, Non-Tender, Hypoactive Bowel Sounds, No Guarding, No Rebound Tenderness, No Guarding  Extremities: Warm, Well Perfused, 2+ Radial and DP Pulses Bilaterally,  3+ Pitting Edema Bilaterally, No Calf Tenderness   Neurological: AAOx3, No Gross Motor or Sensory Deficits   Skin: Jaundiced, Diffuse Anasarca     Labs/Imaging:  CBC Full  -  ( 10 May 2017 17:05 )  WBC Count : 16.0 K/uL  Hemoglobin : 8.3 g/dL  Hematocrit : 24.2 %  Platelet Count - Automated : 123 K/uL  Mean Cell Volume : 90.6 fL  Mean Cell Hemoglobin : 31.1 pg  Mean Cell Hemoglobin Concentration : 34.3 g/dL  Auto Neutrophil # : x  Auto Lymphocyte # : x  Auto Monocyte # : x  Auto Eosinophil # : x  Auto Basophil # : x  Auto Neutrophil % : x  Auto Lymphocyte % : x  Auto Monocyte % : x  Auto Eosinophil % : x  Auto Basophil % : x    05-10    132<L>  |  110<H>  |  90<H>  ----------------------------<  207<H>  7.2<HH>   |  10<LL>  |  2.56<H>    Ca    7.4<L>      10 May 2017 18:17    TPro  5.5<L>  /  Alb  1.9<L>  /  TBili  13.9<H>  /  DBili  x   /  AST  177<H>  /  ALT  45<H>  /  AlkPhos  208<H>  05-10    PT/INR - ( 10 May 2017 17:05 )   PT: 33.9 sec;   INR: 2.99          PTT - ( 10 May 2017 17:05 )  PTT:48.0 sec    Lactic Acid 3.1      CARDIAC MARKERS ( 10 May 2017 16:11 )  <0.015 ng/mL / x     / 150 U/L / x     / 4.0 ng/mL      Lipase 897   Alcohol Level <3    VB.13|23|134|8|99%    CXR (5/10/17): Apparent cardiomegaly. Slight central pulmonary vascular prominence. Mild increased markings at right lung base with the possibility of early infiltrate not excluded. Clinical and laboratory correlation are recommended with regard to possible mild congestive heart failure or right basilar pneumonia.

## 2017-05-11 VITALS — TEMPERATURE: 98 F

## 2017-05-11 DIAGNOSIS — K62.5 HEMORRHAGE OF ANUS AND RECTUM: ICD-10-CM

## 2017-05-11 DIAGNOSIS — R63.8 OTHER SYMPTOMS AND SIGNS CONCERNING FOOD AND FLUID INTAKE: ICD-10-CM

## 2017-05-11 DIAGNOSIS — A41.9 SEPSIS, UNSPECIFIED ORGANISM: ICD-10-CM

## 2017-05-11 DIAGNOSIS — K74.60 UNSPECIFIED CIRRHOSIS OF LIVER: ICD-10-CM

## 2017-05-11 DIAGNOSIS — N17.9 ACUTE KIDNEY FAILURE, UNSPECIFIED: ICD-10-CM

## 2017-05-11 DIAGNOSIS — Z95.828 PRESENCE OF OTHER VASCULAR IMPLANTS AND GRAFTS: Chronic | ICD-10-CM

## 2017-05-11 DIAGNOSIS — Z29.9 ENCOUNTER FOR PROPHYLACTIC MEASURES, UNSPECIFIED: ICD-10-CM

## 2017-05-11 DIAGNOSIS — D64.9 ANEMIA, UNSPECIFIED: ICD-10-CM

## 2017-05-11 DIAGNOSIS — E11.8 TYPE 2 DIABETES MELLITUS WITH UNSPECIFIED COMPLICATIONS: ICD-10-CM

## 2017-05-11 LAB
ALBUMIN SERPL ELPH-MCNC: 1.7 G/DL — LOW (ref 3.4–5)
ALP SERPL-CCNC: 186 U/L — HIGH (ref 40–120)
ALT FLD-CCNC: 78 U/L — HIGH (ref 12–42)
ANION GAP SERPL CALC-SCNC: 13 MMOL/L — SIGNIFICANT CHANGE UP (ref 9–16)
ANION GAP SERPL CALC-SCNC: 15 MMOL/L — SIGNIFICANT CHANGE UP (ref 9–16)
APPEARANCE UR: CLEAR — SIGNIFICANT CHANGE UP
APTT BLD: 50.5 SEC — HIGH (ref 27.5–37.4)
AST SERPL-CCNC: 791 U/L — HIGH (ref 15–37)
BILIRUB SERPL-MCNC: 14 MG/DL — HIGH (ref 0.2–1.2)
BILIRUB UR-MCNC: (no result)
BLD GP AB SCN SERPL QL: NEGATIVE — SIGNIFICANT CHANGE UP
BUN SERPL-MCNC: 93 MG/DL — HIGH (ref 7–23)
BUN SERPL-MCNC: 93 MG/DL — HIGH (ref 7–23)
CALCIUM SERPL-MCNC: 6.6 MG/DL — LOW (ref 8.5–10.5)
CALCIUM SERPL-MCNC: 7 MG/DL — LOW (ref 8.5–10.5)
CHLORIDE SERPL-SCNC: 105 MMOL/L — SIGNIFICANT CHANGE UP (ref 96–108)
CHLORIDE SERPL-SCNC: 107 MMOL/L — SIGNIFICANT CHANGE UP (ref 96–108)
CHLORIDE UR-SCNC: 63 MMOL/L — SIGNIFICANT CHANGE UP
CO2 SERPL-SCNC: 11 MMOL/L — LOW (ref 22–31)
CO2 SERPL-SCNC: 12 MMOL/L — LOW (ref 22–31)
COLOR SPEC: YELLOW — SIGNIFICANT CHANGE UP
CREAT ?TM UR-MCNC: 66.5 MG/DL — SIGNIFICANT CHANGE UP
CREAT SERPL-MCNC: 2.48 MG/DL — HIGH (ref 0.5–1.3)
CREAT SERPL-MCNC: 2.55 MG/DL — HIGH (ref 0.5–1.3)
DIFF PNL FLD: (no result)
FERRITIN SERPL-MCNC: 518 NG/ML — HIGH (ref 26–388)
FOLATE SERPL-MCNC: 13.5 NG/ML — SIGNIFICANT CHANGE UP (ref 4.8–24.2)
GLUCOSE SERPL-MCNC: 206 MG/DL — HIGH (ref 70–99)
GLUCOSE SERPL-MCNC: 233 MG/DL — HIGH (ref 70–99)
GLUCOSE UR QL: NEGATIVE — SIGNIFICANT CHANGE UP
HAPTOGLOB SERPL-MCNC: <8 MG/DL — LOW (ref 31–207)
HAV IGM SER-ACNC: SIGNIFICANT CHANGE UP
HBA1C BLD-MCNC: 5.1 % — SIGNIFICANT CHANGE UP (ref 4.8–5.6)
HBV CORE IGM SER-ACNC: SIGNIFICANT CHANGE UP
HCT VFR BLD CALC: 21.1 % — LOW (ref 39–50)
HCT VFR BLD CALC: 21.4 % — LOW (ref 39–50)
HGB BLD-MCNC: 7.4 G/DL — LOW (ref 13–17)
HGB BLD-MCNC: 7.5 G/DL — LOW (ref 13–17)
IRON SATN MFR SERPL: 29 % — SIGNIFICANT CHANGE UP (ref 26–39)
IRON SATN MFR SERPL: 30 UG/DL — LOW (ref 65–175)
KETONES UR-MCNC: NEGATIVE — SIGNIFICANT CHANGE UP
LACTATE SERPL-SCNC: 3.1 MMOL/L — HIGH (ref 0.5–2)
LDH SERPL L TO P-CCNC: 450 U/L — HIGH (ref 87–241)
LEUKOCYTE ESTERASE UR-ACNC: NEGATIVE — SIGNIFICANT CHANGE UP
MAGNESIUM SERPL-MCNC: 1.9 MG/DL — SIGNIFICANT CHANGE UP (ref 1.6–2.4)
MCHC RBC-ENTMCNC: 31.1 PG — SIGNIFICANT CHANGE UP (ref 27–34)
MCHC RBC-ENTMCNC: 32.1 PG — SIGNIFICANT CHANGE UP (ref 27–34)
MCHC RBC-ENTMCNC: 34.6 G/DL — SIGNIFICANT CHANGE UP (ref 32–36)
MCHC RBC-ENTMCNC: 35.5 G/DL — SIGNIFICANT CHANGE UP (ref 32–36)
MCV RBC AUTO: 89.9 FL — SIGNIFICANT CHANGE UP (ref 80–100)
MCV RBC AUTO: 90.2 FL — SIGNIFICANT CHANGE UP (ref 80–100)
NITRITE UR-MCNC: NEGATIVE — SIGNIFICANT CHANGE UP
OSMOLALITY SERPL: 316 MOSM/KG — HIGH (ref 280–301)
OSMOLALITY UR: 313 MOSMOL/KG — SIGNIFICANT CHANGE UP (ref 100–650)
PH UR: 5 — SIGNIFICANT CHANGE UP (ref 5–8)
PLATELET # BLD AUTO: 73 K/UL — LOW (ref 150–400)
PLATELET # BLD AUTO: 88 K/UL — LOW (ref 150–400)
POTASSIUM SERPL-MCNC: 6.4 MMOL/L — CRITICAL HIGH (ref 3.5–5.3)
POTASSIUM SERPL-MCNC: 6.7 MMOL/L — CRITICAL HIGH (ref 3.5–5.3)
POTASSIUM SERPL-SCNC: 6.4 MMOL/L — CRITICAL HIGH (ref 3.5–5.3)
POTASSIUM SERPL-SCNC: 6.7 MMOL/L — CRITICAL HIGH (ref 3.5–5.3)
POTASSIUM UR-SCNC: 21 MMOL/L — SIGNIFICANT CHANGE UP
PROT SERPL-MCNC: 4.9 G/DL — LOW (ref 6.4–8.2)
PROT UR-MCNC: NEGATIVE MG/DL — SIGNIFICANT CHANGE UP
RBC # BLD: 2.34 M/UL — LOW (ref 4.2–5.8)
RBC # BLD: 2.38 M/UL — LOW (ref 4.2–5.8)
RBC # BLD: 2.38 M/UL — LOW (ref 4.2–5.8)
RBC # FLD: 19.9 % — HIGH (ref 10.3–16.9)
RBC # FLD: 20.7 % — HIGH (ref 10.3–16.9)
RETICS/RBC NFR: 4.2 % — HIGH (ref 0.5–2.5)
RH IG SCN BLD-IMP: POSITIVE — SIGNIFICANT CHANGE UP
SODIUM SERPL-SCNC: 131 MMOL/L — LOW (ref 135–145)
SODIUM SERPL-SCNC: 132 MMOL/L — LOW (ref 135–145)
SODIUM UR-SCNC: 50 MMOL/L — SIGNIFICANT CHANGE UP
SP GR SPEC: 1.02 — SIGNIFICANT CHANGE UP (ref 1–1.03)
TIBC SERPL-MCNC: 104 UG/DL — LOW (ref 250–450)
UROBILINOGEN FLD QL: 0.2 E.U./DL — SIGNIFICANT CHANGE UP
UUN UR-MCNC: 306 MG/DL — SIGNIFICANT CHANGE UP
VIT B12 SERPL-MCNC: 1602 PG/ML — HIGH (ref 243–894)
WBC # BLD: 9.4 K/UL — SIGNIFICANT CHANGE UP (ref 3.8–10.5)
WBC # BLD: 9.5 K/UL — SIGNIFICANT CHANGE UP (ref 3.8–10.5)
WBC # FLD AUTO: 9.4 K/UL — SIGNIFICANT CHANGE UP (ref 3.8–10.5)
WBC # FLD AUTO: 9.5 K/UL — SIGNIFICANT CHANGE UP (ref 3.8–10.5)

## 2017-05-11 PROCEDURE — 80048 BASIC METABOLIC PNL TOTAL CA: CPT

## 2017-05-11 PROCEDURE — 85610 PROTHROMBIN TIME: CPT

## 2017-05-11 PROCEDURE — 82595 ASSAY OF CRYOGLOBULIN: CPT

## 2017-05-11 PROCEDURE — 84300 ASSAY OF URINE SODIUM: CPT

## 2017-05-11 PROCEDURE — 80053 COMPREHEN METABOLIC PANEL: CPT

## 2017-05-11 PROCEDURE — 86900 BLOOD TYPING SEROLOGIC ABO: CPT

## 2017-05-11 PROCEDURE — 83036 HEMOGLOBIN GLYCOSYLATED A1C: CPT

## 2017-05-11 PROCEDURE — 82330 ASSAY OF CALCIUM: CPT

## 2017-05-11 PROCEDURE — 83735 ASSAY OF MAGNESIUM: CPT

## 2017-05-11 PROCEDURE — 83605 ASSAY OF LACTIC ACID: CPT

## 2017-05-11 PROCEDURE — 81001 URINALYSIS AUTO W/SCOPE: CPT

## 2017-05-11 PROCEDURE — 99238 HOSP IP/OBS DSCHRG MGMT 30/<: CPT | Mod: GV

## 2017-05-11 PROCEDURE — 80074 ACUTE HEPATITIS PANEL: CPT

## 2017-05-11 PROCEDURE — 36415 COLL VENOUS BLD VENIPUNCTURE: CPT

## 2017-05-11 PROCEDURE — 83550 IRON BINDING TEST: CPT

## 2017-05-11 PROCEDURE — 82570 ASSAY OF URINE CREATININE: CPT

## 2017-05-11 PROCEDURE — 99223 1ST HOSP IP/OBS HIGH 75: CPT | Mod: GV

## 2017-05-11 PROCEDURE — 84133 ASSAY OF URINE POTASSIUM: CPT

## 2017-05-11 PROCEDURE — 83010 ASSAY OF HAPTOGLOBIN QUANT: CPT

## 2017-05-11 PROCEDURE — 86850 RBC ANTIBODY SCREEN: CPT

## 2017-05-11 PROCEDURE — 82746 ASSAY OF FOLIC ACID SERUM: CPT

## 2017-05-11 PROCEDURE — 94640 AIRWAY INHALATION TREATMENT: CPT

## 2017-05-11 PROCEDURE — 83930 ASSAY OF BLOOD OSMOLALITY: CPT

## 2017-05-11 PROCEDURE — 87040 BLOOD CULTURE FOR BACTERIA: CPT

## 2017-05-11 PROCEDURE — 83935 ASSAY OF URINE OSMOLALITY: CPT

## 2017-05-11 PROCEDURE — 83880 ASSAY OF NATRIURETIC PEPTIDE: CPT

## 2017-05-11 PROCEDURE — 82553 CREATINE MB FRACTION: CPT

## 2017-05-11 PROCEDURE — 82803 BLOOD GASES ANY COMBINATION: CPT

## 2017-05-11 PROCEDURE — 84295 ASSAY OF SERUM SODIUM: CPT

## 2017-05-11 PROCEDURE — 83690 ASSAY OF LIPASE: CPT

## 2017-05-11 PROCEDURE — 96375 TX/PRO/DX INJ NEW DRUG ADDON: CPT

## 2017-05-11 PROCEDURE — 83615 LACTATE (LD) (LDH) ENZYME: CPT

## 2017-05-11 PROCEDURE — 84484 ASSAY OF TROPONIN QUANT: CPT

## 2017-05-11 PROCEDURE — 86901 BLOOD TYPING SEROLOGIC RH(D): CPT

## 2017-05-11 PROCEDURE — 82607 VITAMIN B-12: CPT

## 2017-05-11 PROCEDURE — 85027 COMPLETE CBC AUTOMATED: CPT

## 2017-05-11 PROCEDURE — 80307 DRUG TEST PRSMV CHEM ANLYZR: CPT

## 2017-05-11 PROCEDURE — 82585 ASSAY OF CRYOFIBRINOGEN: CPT

## 2017-05-11 PROCEDURE — 99285 EMERGENCY DEPT VISIT HI MDM: CPT | Mod: 25

## 2017-05-11 PROCEDURE — 85045 AUTOMATED RETICULOCYTE COUNT: CPT

## 2017-05-11 PROCEDURE — 84132 ASSAY OF SERUM POTASSIUM: CPT

## 2017-05-11 PROCEDURE — 82436 ASSAY OF URINE CHLORIDE: CPT

## 2017-05-11 PROCEDURE — 82550 ASSAY OF CK (CPK): CPT

## 2017-05-11 PROCEDURE — 93010 ELECTROCARDIOGRAM REPORT: CPT

## 2017-05-11 PROCEDURE — 93005 ELECTROCARDIOGRAM TRACING: CPT

## 2017-05-11 PROCEDURE — 85730 THROMBOPLASTIN TIME PARTIAL: CPT

## 2017-05-11 PROCEDURE — 96374 THER/PROPH/DIAG INJ IV PUSH: CPT

## 2017-05-11 PROCEDURE — 82140 ASSAY OF AMMONIA: CPT

## 2017-05-11 PROCEDURE — 71045 X-RAY EXAM CHEST 1 VIEW: CPT

## 2017-05-11 PROCEDURE — 82728 ASSAY OF FERRITIN: CPT

## 2017-05-11 PROCEDURE — 85025 COMPLETE CBC W/AUTO DIFF WBC: CPT

## 2017-05-11 PROCEDURE — 84540 ASSAY OF URINE/UREA-N: CPT

## 2017-05-11 PROCEDURE — 87521 HEPATITIS C PROBE&RVRS TRNSC: CPT

## 2017-05-11 RX ORDER — HEPARIN SODIUM 5000 [USP'U]/ML
5000 INJECTION INTRAVENOUS; SUBCUTANEOUS EVERY 8 HOURS
Qty: 0 | Refills: 0 | Status: DISCONTINUED | OUTPATIENT
Start: 2017-05-11 | End: 2017-05-11

## 2017-05-11 RX ORDER — DEXTROSE 50 % IN WATER 50 %
12.5 SYRINGE (ML) INTRAVENOUS ONCE
Qty: 0 | Refills: 0 | Status: DISCONTINUED | OUTPATIENT
Start: 2017-05-11 | End: 2017-05-11

## 2017-05-11 RX ORDER — GLUCAGON INJECTION, SOLUTION 0.5 MG/.1ML
1 INJECTION, SOLUTION SUBCUTANEOUS ONCE
Qty: 0 | Refills: 0 | Status: DISCONTINUED | OUTPATIENT
Start: 2017-05-11 | End: 2017-05-11

## 2017-05-11 RX ORDER — DEXTROSE 50 % IN WATER 50 %
1 SYRINGE (ML) INTRAVENOUS ONCE
Qty: 0 | Refills: 0 | Status: DISCONTINUED | OUTPATIENT
Start: 2017-05-11 | End: 2017-05-11

## 2017-05-11 RX ORDER — VANCOMYCIN HCL 1 G
1000 VIAL (EA) INTRAVENOUS ONCE
Qty: 0 | Refills: 0 | Status: COMPLETED | OUTPATIENT
Start: 2017-05-11 | End: 2017-05-11

## 2017-05-11 RX ORDER — SODIUM POLYSTYRENE SULFONATE 4.1 MEQ/G
30 POWDER, FOR SUSPENSION ORAL ONCE
Qty: 0 | Refills: 0 | Status: COMPLETED | OUTPATIENT
Start: 2017-05-11 | End: 2017-05-11

## 2017-05-11 RX ORDER — PIPERACILLIN AND TAZOBACTAM 4; .5 G/20ML; G/20ML
2.25 INJECTION, POWDER, LYOPHILIZED, FOR SOLUTION INTRAVENOUS EVERY 8 HOURS
Qty: 0 | Refills: 0 | Status: DISCONTINUED | OUTPATIENT
Start: 2017-05-11 | End: 2017-05-11

## 2017-05-11 RX ORDER — LACTULOSE 10 G/15ML
1 SOLUTION ORAL
Qty: 0 | Refills: 0 | COMMUNITY

## 2017-05-11 RX ORDER — PIPERACILLIN AND TAZOBACTAM 4; .5 G/20ML; G/20ML
2.25 INJECTION, POWDER, LYOPHILIZED, FOR SOLUTION INTRAVENOUS EVERY 6 HOURS
Qty: 0 | Refills: 0 | Status: DISCONTINUED | OUTPATIENT
Start: 2017-05-11 | End: 2017-05-11

## 2017-05-11 RX ORDER — FUROSEMIDE 40 MG
40 TABLET ORAL ONCE
Qty: 0 | Refills: 0 | Status: COMPLETED | OUTPATIENT
Start: 2017-05-11 | End: 2017-05-11

## 2017-05-11 RX ORDER — PIPERACILLIN AND TAZOBACTAM 4; .5 G/20ML; G/20ML
2.25 INJECTION, POWDER, LYOPHILIZED, FOR SOLUTION INTRAVENOUS ONCE
Qty: 0 | Refills: 0 | Status: COMPLETED | OUTPATIENT
Start: 2017-05-11 | End: 2017-05-11

## 2017-05-11 RX ORDER — PIPERACILLIN AND TAZOBACTAM 4; .5 G/20ML; G/20ML
INJECTION, POWDER, LYOPHILIZED, FOR SOLUTION INTRAVENOUS
Qty: 0 | Refills: 0 | Status: DISCONTINUED | OUTPATIENT
Start: 2017-05-11 | End: 2017-05-11

## 2017-05-11 RX ORDER — SODIUM CHLORIDE 9 MG/ML
1000 INJECTION, SOLUTION INTRAVENOUS
Qty: 0 | Refills: 0 | Status: DISCONTINUED | OUTPATIENT
Start: 2017-05-11 | End: 2017-05-11

## 2017-05-11 RX ORDER — SODIUM POLYSTYRENE SULFONATE 4.1 MEQ/G
30 POWDER, FOR SUSPENSION ORAL DAILY
Qty: 0 | Refills: 0 | Status: COMPLETED | OUTPATIENT
Start: 2017-05-11 | End: 2017-05-11

## 2017-05-11 RX ORDER — INSULIN ASPART 100 [IU]/ML
14 INJECTION, SOLUTION SUBCUTANEOUS
Qty: 0 | Refills: 0 | COMMUNITY

## 2017-05-11 RX ORDER — INSULIN DETEMIR 100/ML (3)
20 INSULIN PEN (ML) SUBCUTANEOUS
Qty: 0 | Refills: 0 | COMMUNITY

## 2017-05-11 RX ORDER — DEXTROSE 50 % IN WATER 50 %
25 SYRINGE (ML) INTRAVENOUS ONCE
Qty: 0 | Refills: 0 | Status: DISCONTINUED | OUTPATIENT
Start: 2017-05-11 | End: 2017-05-11

## 2017-05-11 RX ORDER — INSULIN LISPRO 100/ML
VIAL (ML) SUBCUTANEOUS
Qty: 0 | Refills: 0 | Status: DISCONTINUED | OUTPATIENT
Start: 2017-05-11 | End: 2017-05-11

## 2017-05-11 RX ORDER — PANTOPRAZOLE SODIUM 20 MG/1
40 TABLET, DELAYED RELEASE ORAL DAILY
Qty: 0 | Refills: 0 | Status: DISCONTINUED | OUTPATIENT
Start: 2017-05-11 | End: 2017-05-11

## 2017-05-11 RX ORDER — HYDROMORPHONE HYDROCHLORIDE 2 MG/ML
4 INJECTION INTRAMUSCULAR; INTRAVENOUS; SUBCUTANEOUS EVERY 4 HOURS
Qty: 0 | Refills: 0 | Status: DISCONTINUED | OUTPATIENT
Start: 2017-05-11 | End: 2017-05-11

## 2017-05-11 RX ADMIN — PIPERACILLIN AND TAZOBACTAM 200 GRAM(S): 4; .5 INJECTION, POWDER, LYOPHILIZED, FOR SOLUTION INTRAVENOUS at 02:31

## 2017-05-11 RX ADMIN — LACTULOSE 15 GRAM(S): 10 SOLUTION ORAL at 06:58

## 2017-05-11 RX ADMIN — SODIUM POLYSTYRENE SULFONATE 30 GRAM(S): 4.1 POWDER, FOR SUSPENSION ORAL at 02:29

## 2017-05-11 RX ADMIN — SODIUM CHLORIDE 200 MILLILITER(S): 9 INJECTION, SOLUTION INTRAVENOUS at 07:31

## 2017-05-11 RX ADMIN — Medication 40 MILLIGRAM(S): at 06:58

## 2017-05-11 RX ADMIN — PIPERACILLIN AND TAZOBACTAM 200 GRAM(S): 4; .5 INJECTION, POWDER, LYOPHILIZED, FOR SOLUTION INTRAVENOUS at 13:00

## 2017-05-11 RX ADMIN — PANTOPRAZOLE SODIUM 40 MILLIGRAM(S): 20 TABLET, DELAYED RELEASE ORAL at 12:05

## 2017-05-11 RX ADMIN — Medication 4: at 07:37

## 2017-05-11 RX ADMIN — ALBUTEROL 2.5 MILLIGRAM(S): 90 AEROSOL, METERED ORAL at 00:13

## 2017-05-11 RX ADMIN — Medication 40 MILLIGRAM(S): at 00:13

## 2017-05-11 RX ADMIN — HYDROMORPHONE HYDROCHLORIDE 4 MILLIGRAM(S): 2 INJECTION INTRAMUSCULAR; INTRAVENOUS; SUBCUTANEOUS at 13:45

## 2017-05-11 RX ADMIN — Medication 40 MILLIGRAM(S): at 11:10

## 2017-05-11 RX ADMIN — HYDROMORPHONE HYDROCHLORIDE 4 MILLIGRAM(S): 2 INJECTION INTRAMUSCULAR; INTRAVENOUS; SUBCUTANEOUS at 14:26

## 2017-05-11 RX ADMIN — SODIUM CHLORIDE 200 MILLILITER(S): 9 INJECTION, SOLUTION INTRAVENOUS at 13:42

## 2017-05-11 RX ADMIN — Medication 250 MILLIGRAM(S): at 02:35

## 2017-05-11 RX ADMIN — SODIUM POLYSTYRENE SULFONATE 30 GRAM(S): 4.1 POWDER, FOR SUSPENSION ORAL at 12:05

## 2017-05-11 RX ADMIN — SODIUM CHLORIDE 999 MILLILITER(S): 9 INJECTION, SOLUTION INTRAVENOUS at 00:16

## 2017-05-11 RX ADMIN — SODIUM CHLORIDE 200 MILLILITER(S): 9 INJECTION, SOLUTION INTRAVENOUS at 00:21

## 2017-05-11 RX ADMIN — Medication 4: at 11:17

## 2017-05-11 RX ADMIN — PIPERACILLIN AND TAZOBACTAM 200 GRAM(S): 4; .5 INJECTION, POWDER, LYOPHILIZED, FOR SOLUTION INTRAVENOUS at 07:17

## 2017-05-11 NOTE — PROGRESS NOTE ADULT - PROBLEM SELECTOR PLAN 1
pt currently oliguric s/p [id number] nabicarb @ 200 cc/hr with mild improvement in potassium and cont acidemia  - aware pt requests to be discharged home today as would like to be treated in [place].   obtain vbg  recommend sodium bicarb PO 1330 tid   close f.u on d/c in [place]

## 2017-05-11 NOTE — PROGRESS NOTE ADULT - ASSESSMENT
IMPRESSION:  51M PMHx of hepatitis C cirrhosis s/p TIPS 1 month ago (s/p Hep C treatment), HCC, DMII, AS s/p TAVR recently arranged to be home with hospice care presents to [facility] after collapse. Admitted with severe metabolic abnormalities     PLAN  #Hyperkalemia with EKG changes (OR prolongation) s/p calcium gluconate, albuterol - likely 2/2 JULIANNE and/or component of hemolysis (anemia, low hapt, high LDH). Renal consulted and appreciate there recs. Improved to 6.4 s/p lasix and kayex. Will likely c/w kayex and also lasix today as patient with good urine output.     #Metabolic acidosis - delta/delta < 1 so mixed AG NAG acidosis. AG 2/2 T2 lactic acidosis and uremia. NAG likely 2/2 JULIANNE. renal following and appreciate recs. will c/w bicarb gtt and serial BMP.     #Kidney injury - urine lytes consistent with pre-renal. improved from 2.56 > 2.48 while on isotonic gtt 200cc/hr. Hepatorenal syndrome is also on the ddx but is not consistent with the improvement in creatinine while on fluids. Will continue to monitor closely and appreciate renal recs.     #Hyponatremia - 2/2 cirrhosis. poor prognosticator. stable over last day.      #Sepsis - SIRS + and possible infiltrate on CXR. May have triggered cirrhotic decompensation. Will c/w broad spectrum abx and f/u Cx results.     #Anemia - episode of brprp and h/o hemorroids. on exam no blood in rectal vault. Anemia 7-8 likely 2/2 end stage cirrhosis. will monitor daily CBC and transfuse if Hg < 7.       #Thrombocytopenia - likely 2/2 end stage cirrhosis. Will monitor with daily CBC and transfuse if unstable bleed or if < 10k.     #Hep C liver cirrhosis with HCC. High MELD score. Poor prognosis. Home hospice. C/w lactulose.     #DM - c/w ISS and monitor FS.     #Diet - currently clears, advance as tolerated - diabetic, low sodium     #PPX - Currently not on SQH. Cirrhotics are prothrombotic so will f/u with team about starting VTE ppx.     Need to discuss with pt code status IMPRESSION:  51M PMHx of hepatitis C cirrhosis s/p TIPS 1 month ago (s/p Hep C treatment), HCC, DMII, AS s/p TAVR recently arranged to be home with hospice care presents to [facility] after collapse. Admitted with severe metabolic abnormalities     PLAN  #Hyperkalemia with EKG changes (GA prolongation) s/p calcium gluconate, albuterol - likely 2/2 JULIANNE and/or component of hemolysis (anemia, low hapt, high LDH). Renal consulted and appreciate there recs. Improved to 6.4 s/p lasix and kayex. Will likely c/w kayex and also lasix today as patient with good urine output.     #Metabolic acidosis - delta/delta < 1 so mixed AG NAG acidosis. AG 2/2 T2 lactic acidosis and uremia. NAG likely 2/2 JULIANNE. renal following and appreciate recs. will c/w bicarb gtt and serial BMP.     #Kidney injury - urine lytes consistent with pre-renal. improved from 2.56 > 2.48 while on isotonic gtt 200cc/hr. Hepatorenal syndrome is also on the ddx but is not consistent with the improvement in creatinine while on fluids. Will continue to monitor closely and appreciate renal recs.     #Hyponatremia - 2/2 cirrhosis. poor prognosticator. stable over last day.      #Ascites - will consider dx and therapeutic tap today.     #Sepsis - SIRS + and possible infiltrate on CXR. May have triggered cirrhotic decompensation. Will c/w broad spectrum abx and f/u Cx results.     #Anemia - episode of brprp and h/o hemorroids. on exam no blood in rectal vault. Anemia 7-8 likely 2/2 end stage cirrhosis. will monitor daily CBC and transfuse if Hg < 7.       #Thrombocytopenia - likely 2/2 end stage cirrhosis. Will monitor with daily CBC and transfuse if unstable bleed or if < 10k.     #Hep C liver cirrhosis with HCC. High MELD score. Poor prognosis. Home hospice. C/w lactulose.     #DM - c/w ISS and monitor FS.     #Diet - currently clears, advance as tolerated - diabetic, low sodium     #PPX - Currently not on SQH. Cirrhotics are prothrombotic so will f/u with team about starting VTE ppx.     Need to discuss with pt code status IMPRESSION:  51M PMHx of hepatitis C cirrhosis s/p TIPS 1 month ago (s/p Hep C treatment), HCC, DMII, AS s/p TAVR recently arranged to be home with hospice care presents to [facility] after collapse. Admitted with severe metabolic abnormalities     PLAN  #Hyperkalemia with EKG changes (OK prolongation) s/p calcium gluconate, albuterol - likely 2/2 JULIANNE and/or component of hemolysis (anemia, low hapt, high LDH). Renal consulted and appreciate there recs. Improved to 6.4 s/p lasix and kayex. Will likely c/w kayex and also lasix today as patient with good urine output.     #Metabolic acidosis - delta/delta < 1 so mixed AG NAG acidosis. AG 2/2 T2 lactic acidosis and uremia. NAG likely 2/2 JULIANNE. renal following and appreciate recs. will c/w bicarb gtt and serial BMP.     #Kidney injury - urine lytes consistent with pre-renal. improved from 2.56 > 2.48 while on isotonic gtt 200cc/hr. Hepatorenal syndrome is also on the ddx but is not consistent with marginal improvement in creatinine while on fluids. Will continue to monitor closely and appreciate renal recs.     #Hyponatremia - 2/2 cirrhosis. poor prognosticator. stable over last day.      #Ascites - will consider dx and therapeutic tap today.     #Sepsis - SIRS + and possible infiltrate on CXR. May have triggered cirrhotic decompensation. Will c/w broad spectrum abx and f/u Cx results.     #Anemia - episode of brprp and h/o hemorroids. on exam no blood in rectal vault. Anemia 7-8 likely 2/2 end stage cirrhosis. will monitor daily CBC and transfuse if Hg < 7.       #Thrombocytopenia - likely 2/2 end stage cirrhosis. Will monitor with daily CBC and transfuse if unstable bleed or if < 10k.     #Hep C liver cirrhosis with HCC. High MELD score. Poor prognosis. Home hospice. C/w lactulose.     #DM - c/w ISS and monitor FS.     #Diet - currently clears, advance as tolerated - diabetic, low sodium     #PPX - Currently not on SQH. Cirrhotics are prothrombotic so will f/u with team about starting VTE ppx.     Need to discuss with pt code status. Palliaitive consult.

## 2017-05-11 NOTE — PROGRESS NOTE ADULT - SUBJECTIVE AND OBJECTIVE BOX
Dr. Zhou Lainez  Renal Fellow  Beeper # [phone]        Patient seen and examined at bedside.   reports urinating " alot " overnight in a urinal.   denies nausea vomitting diarrhea sob or chest pain.   requests to be transferred back to [place] today.     lactulose Syrup 15Gram(s) two times a day  dextrose 5% 1000milliLiter(s) <Continuous>  piperacillin/tazobactam IVPB.    insulin lispro (HumaLOG) corrective regimen sliding scale  four times a day before meals  dextrose 5%. 1000milliLiter(s) <Continuous>  dextrose Gel 1Dose(s) once PRN  dextrose 50% Injectable 12.5Gram(s) once  dextrose 50% Injectable 25Gram(s) once  dextrose 50% Injectable 25Gram(s) once  glucagon  Injectable 1milliGRAM(s) once PRN  piperacillin/tazobactam IVPB. 2.25Gram(s) every 6 hours  sodium polystyrene sulfonate Suspension 30Gram(s) daily  pantoprazole  Injectable 40milliGRAM(s) daily  heparin  Injectable 5000Unit(s) every 8 hours      Allergies    No Known Allergies    Intolerances        T(C): , Max: 36.7 (05-10 @ 15:14)  T(F): , Max: 98 (05-10 @ 15:14)  HR: 104  BP: 124/66  BP(mean): 86  RR: 16  SpO2: 99%  Wt(kg): --  I & Os for 24h ending  @ 07:00  =============================================  IN:    Total IN: 0 ml  ---------------------------------------------  OUT:    Voided: 1050 ml    Total OUT: 1050 ml  ---------------------------------------------  Total NET: -1050 ml    I & Os for current day (as of  @ 11:37)  =============================================  IN:    dextrose 5%: 600 ml    Oral Fluid: 120 ml    Total IN: 720 ml  ---------------------------------------------  OUT:    Voided: 450 ml    Total OUT: 450 ml  ---------------------------------------------  Total NET: 270 ml      Weight (kg): 82.7 (05-10 @ 21:55)    PHYSICAL EXAM:  Constitutional: ill appearning.  No acute distress  ENMT: Moist mucous membrane.  No cyanosis.  Neck: Supple. No JVD.  Back: No CVA tenderness  Respiratory: Clear to auscultation   Cardiovascular: S1, S2.  Regular rate and rhythm.    Gastrointestinal: soft, ndistended abdomen.   Extremities: Warm.  No lower extremity edema.    Neurological: No focal deficits.  Skin: Warm. Dry.    Lymph Nodes:  No cervical lymph nodes.    Psychiatric: Normal affect.        LABS:                        7.5    9.4   )-----------( 88       ( 11 May 2017 06:00 )             21.1         131<L>  |  105  |  93<H>  ----------------------------<  233<H>  6.4<HH>   |  11<L>  |  2.48<H>    Ca    6.6<L>      11 May 2017 05:51  Mg     1.9         TPro  4.9<L>  /  Alb  1.7<L>  /  TBili  14.0<H>  /  DBili  x   /  AST  791<H>  /  ALT  78<H>  /  AlkPhos  186<H>      Hemoglobin A1C, Whole Blood: 5.1 % [4.8 - 5.6] ( @ 06:00)  Osmolality, Serum: 316 mosm/kg <H> [280 - 301] (05-10 @ 16:11)    PT/INR - ( 10 May 2017 17:05 )   PT: 33.9 sec;   INR: 2.99          PTT - ( 11 May 2017 06:04 )  PTT:50.5 sec  Urinalysis Basic - ( 11 May 2017 02:15 )    Color: Yellow / Appearance: Clear / S.020 / pH: x  Gluc: x / Ketone: NEGATIVE  / Bili: Moderate / Urobili: 0.2 E.U./dL   Blood: x / Protein: NEGATIVE mg/dL / Nitrite: NEGATIVE   Leuk Esterase: NEGATIVE / RBC: 5-10 /HPF / WBC < 5 /HPF   Sq Epi: x / Non Sq Epi: Rare /HPF / Bacteria: Many /HPF      Potassium, Random Urine: 21 mmoL/L ( @ 02:16)  Chloride, Random Urine: 63 mmoL/L ( @ 02:16)        RADIOLOGY & ADDITIONAL STUDIES:

## 2017-05-11 NOTE — DISCHARGE NOTE ADULT - HOSPITAL COURSE
51M on home hospice in [place] PMHx of hepatitis C cirrhosis s/p TIPS 1 month ago (s/p Hep C treatment), HCC, DMII, AS s/p TAVR recently arranged to be home with hospice care presents to [facility] after collapse. Patient travelled to [place] to renew his visa and while walking in the street he started feeling very weak, dizzy, light headed and had to fall to the ground. No loss of consciousness. seizure activity, palpitations, chest pain. He had been in his usual state of health prior to the episode and denies any recent illness, fever/chills, URI symptoms, medication changes. Takes lactulose daily with daily BMs. Denies any known history of kidney disease.       In the ED: Vitals T 98, -107, -127/51-57, RR 16-18, Sat 97-99. Pt was anasarcic but in NAD. Found to be hyperkalemic with prolonged ID interval and given albuterol neb, insulin 10 units + D50%, kayexalate. Was also found to have metabolic acidosis, kidney injury likely hepatorenal vs. prerenal, anemia and thrombocytopenia likely 2/2 end stage cirrhosis.  He met SIRS criteria, CXR was with possible infiltrate so he was started on broad spectrum abx. ICU was consulted and patient admitted to telemetry unit. MELD score 38. Renal was consulted for management of hyperkalemia and acidosis. Was given lasix with good urine output and started on bicarb gtt. K improved from 7.4 > 6.4. Palliative and social work were involved as patient wanted to return back to home hospice as soon as possible. He had come to [facility] from [place] on a bus in order to go to [employer]. SW spoke with pt's wife and original plan was for her to pick him up later in day and drive him home. During day pt informed medical team that he wished to leave AMA as he had a 6pm bus ticket back to [place]. Pt was informed of risks of leaving hospital including death and pt understood these risks. He is being discharged AMA and was given medical records to take with him home.

## 2017-05-11 NOTE — PROGRESS NOTE ADULT - SUBJECTIVE AND OBJECTIVE BOX
INTERVAL HPI/OVERNIGHT EVENTS: Renal consulted for hyperkalemia.  SUBJECTIVE:     VITAL SIGNS:  T(F): 97.6, Max: 98 (05-10 @ 15:14)  HR: 102 (100 - 107)  BP: 127/60 (105/51 - 127/60)  RR: 18 (16 - 18), SpO2: 100% (97% - 100%) RA  I&O: urine output for night shift 850cc    PHYSICAL EXAM:  Constitutional:  Eyes:  ENMT:  Neck:  Respiratory:  Cardiovascular:  Gastrointestinal:  Extremities:  Vascular:  Neurological:  Musculoskeletal:    LABS:                        7.5    9.4   )-----------( 88       ( 11 May 2017 06:00 )             21.1     05-11    131<L>  |  105  |  93<H>  ----------------------------<  233<H>  6.4<HH>   |  11<L>  |  2.48<H>    Ca    6.6<L>      11 May 2017 05:51  Mg     1.9     05-11    TPro  4.9<L>  /  Alb  1.7<L>  /  TBili  14.0<H>  /  DBili  x   /  AST  791<H>  /  ALT  78<H>  /  AlkPhos  186<H>  05-11    RADIOLOGY & ADDITIONAL TESTS: No new imaging INTERVAL HPI/OVERNIGHT EVENTS: Renal consulted for hyperkalemia.  SUBJECTIVE: n/v x 2, non bloody. L shoulder pain.     VITAL SIGNS:  T(F): 97.6, Max: 98 (05-10 @ 15:14)  HR: 102 (100 - 107)  BP: 127/60 (105/51 - 127/60)  RR: 18 (16 - 18), SpO2: 100% (97% - 100%) RA  I&O: urine output for night shift 850cc    PHYSICAL EXAM:  Constitutional: NAD  Eyes: PERRL, icteric  ENMT: MMM  Neck: supple  Respiratory: bibasilar crackles  Cardiovascular: RRR, no murmurs  Gastrointestinal: distended, tender to palpation. brown stool in rectal vault, tenderness on digital exam.   Extremities: WWP, edematous  Vascular: +radial, DPs  Neurological: A&O x 3, no asterixis  Musculoskeletal: no joint swelling    LABS:                        7.5    9.4   )-----------( 88       ( 11 May 2017 06:00 )             21.1     05-11    131<L>  |  105  |  93<H>  ----------------------------<  233<H>  6.4<HH>   |  11<L>  |  2.48<H>    Ca    6.6<L>      11 May 2017 05:51  Mg     1.9     05-11    TPro  4.9<L>  /  Alb  1.7<L>  /  TBili  14.0<H>  /  DBili  x   /  AST  791<H>  /  ALT  78<H>  /  AlkPhos  186<H>  05-11    RADIOLOGY & ADDITIONAL TESTS: No new imaging

## 2017-05-11 NOTE — PROGRESS NOTE ADULT - ATTENDING COMMENTS
Patient seen and examined with house-staff during bedside rounds.  Resident note read, including vitals, physical findings, laboratory data, and radiological reports.   Revisions included below.  Direct personal management at bed side and extensive interpretation of the data.  Plan was outlined and discussed in details with the housestaff.  Decision making of high complexity  I discussed the case with the patient and nephrology. Change to oral sodium bicarb. Continue kayaxalate and lactulose.  Follow on labs  I indicated to the patient that he is not stable for discharge.  Patient will sign AMA and aware of the consequences.

## 2017-05-11 NOTE — CONSULT NOTE ADULT - ASSESSMENT
51M PMHx of hepatitis C cirrhosis s/p TIPS 1 month ago (s/p Hep C treatment), HCC, DMII, AS s/p TAVR recently arranged to be home with hospice care presents to [facility] after collapse. Concern for decompensated liver failure, acute renal failure, hyperkalemia. Likely all secondary to worsening HCC and cirrhosis   - able to locate hospice service in [facility]   [phone] spoke with Tamiko, care manager who is aware that pt is presently admitted to [facility]. Pt will be revoked from his hospice services at this time. Requests any labs/radiology to be sent to hospice. Pt will be reinstated upon return home to [place]  -pt wife unable to come to [place] over the next few days to escort  home due to work obligations. She wishes for him to return via ambulance. SW to look into feasibility and pricing for that. was able to to obtain insurance information  -consider Dilaudid 4 mg po q 4 hr ATC for pain. Per Tamiko, pt was to begin using Morphine this week. Use Dilaudid  2/2 Creat elevation   -consider reglan for nausea  -continue Lactulose for hyperkalemia    Discussed with medical team, CM, and SW.
Patient is a 51 year old male with past medical  history of Hepatitis C Liver Cirrhosis (Reports Hepatitis C Treated, However Liver Failure Progressed, Requiring TIPS 1 Month Ago, Now With Worsening Hepatocellular Carcinoma), Type 2 Diabetes, Recently Placed on Home Hospice, Visiting [place] to have Visa Renewed, Became Weak, Dizzy and Collapsed. Etiology of Weakness Unclear, Possible New Underlying Infection, and/or Related to Progression of Liver Failure.

## 2017-05-11 NOTE — DISCHARGE NOTE ADULT - CARE PLAN
Principal Discharge DX:	Cirrhosis  Goal:	Patient left AMA  Instructions for follow-up, activity and diet:	Patient left AMA

## 2017-05-11 NOTE — DISCHARGE NOTE ADULT - PATIENT PORTAL LINK FT
“You can access the Follow[facility] Patient Portal, offered by [facility], by registering with the following website: [email or web address]”

## 2017-05-11 NOTE — CHART NOTE - NSCHARTNOTEFT_GEN_A_CORE
PGY-2 Event Note    I was called to the bedside by PGY-1 for patient requesting to leave AMA. Patient seen and examined at bedside. Patient states that he wishes to leave the hospital and is willing to do so against medical advice if we are not yet ready to discharge him. Earlier today, social work and Palliative medicine were able to contact his wife and hospice agency. The agency will reinstate services when he is discharged from the hospital. The patient's insurance will not cover long distance transport by ambulance to his home in [place] and the patient cannot afford this additional cost himself. The plan discussed with the wife by the case management team was that she would drive into the city to pick him up and bring him home. Now, the patient reports that his wife cannot come. He plans to take a cab from the hospital to [facility] where he will take a train to [place]. I discussed his current medical status and problems with him at length. He is able to state them back to me. He understands that not continuing treatment may result in injury or death. He expresses his continued desire to leave against medical advice. He signed the appropriate forms and was discharged against medical advice.

## 2017-05-11 NOTE — CONSULT NOTE ADULT - SUBJECTIVE AND OBJECTIVE BOX
FLYNN SHEETS   MRN-[id number]     : 05/10/2017    HPI:  51M PMHx of hepatitis C cirrhosis s/p TIPS 1 month ago (s/p Hep C treatment), HCC, DMII, AS s/p TAVR recently arranged to be home with hospice care presents to [facility] after collapse. Patient travelled to [place] to renew his visa and while walking in the street he started feeling very weak, dizzy, light headed and had to fall to the ground. No loss of consciousness. seizure activity, palpitations, chest pain. He had been in his usual state of health prior to the episode and denies any recent illness, fever/chills, URI symptoms, medication changes. Takes lactulose daily with daily BMs. Denies any known history of kidney disease.     Patient repeatedly asking to go home to [place] where his family lives. Has been on home hospice for 1 week and understands these implications. Would like to be treated fully here in order to allow him to get back home. Reluctant to dialysis, but amenable if he fails medical management     In the ED: Vitals T 98, -107, -127/51-57, RR 16-18, Sat 97-99. Found to be hyperkalemic and given albuterol neb, insulin 10 units + D50%, kayexalate. ICU was consulted and patient admitted to [facility] (10 May 2017 22:23)      PAST MEDICAL & SURGICAL HISTORY:  Hernia  Cirrhosis  Hepatitis C  DM type 2 (diabetes mellitus, type 2)  S/P TIPS (transjugular intrahepatic portosystemic shunt)      FAMILY HISTORY: unknown    Reviewed    ROS:    Unable to attain due to:                      Dyspnea: N                      N/V Y:                              Secretions (N               Agitation Y  Pain (Y/N): diffuse abdominal pain      -Provocation/Palliation:   -Quality/Quantity: sharp, stabbing, throbbing  -Radiating: yes  -Severity: 10  -Timing/Frequency: constant  -Impact on ADLs: debility    General: + debility  HEENT:    Denied  Neck:  Denied  CVS:  Denied  Resp:  Denied  GI:  + pain  :  Denied  Musc:  + weakness  Neuro:  Denied  Psych:  Denied  Skin:  Denied  Lymph:  Denied    Allergies    No Known Allergies    Intolerances      Opiate Naive (Y/N): YES      Medications:      MEDICATIONS  (STANDING):  lactulose Syrup 15Gram(s) Oral two times a day  dextrose 5% 1000milliLiter(s) IV Continuous <Continuous>  piperacillin/tazobactam IVPB.  IV Intermittent   insulin lispro (HumaLOG) corrective regimen sliding scale  SubCutaneous four times a day before meals  dextrose 5%. 1000milliLiter(s) IV Continuous <Continuous>  dextrose 50% Injectable 12.5Gram(s) IV Push once  dextrose 50% Injectable 25Gram(s) IV Push once  dextrose 50% Injectable 25Gram(s) IV Push once  piperacillin/tazobactam IVPB. 2.25Gram(s) IV Intermittent every 6 hours  pantoprazole  Injectable 40milliGRAM(s) IV Push daily    MEDICATIONS  (PRN):  dextrose Gel 1Dose(s) Oral once PRN Blood Glucose LESS THAN 70 milliGRAM(s)/deciliter  glucagon  Injectable 1milliGRAM(s) IntraMuscular once PRN Glucose LESS THAN 70 milligrams/deciliter  HYDROmorphone   Tablet 4milliGRAM(s) Oral every 4 hours PRN Severe Pain (7 - 10)      Labs:    CBC:                        7.5    9.4   )-----------( 88       ( 11 May 2017 06:00 )             21.1     CMP:    05    131<L>  |  105  |  93<H>  ----------------------------<  233<H>  6.4<HH>   |  11<L>  |  2.48<H>    Ca    6.6<L>      11 May 2017 05:51  Mg     1.9         TPro  4.9<L>  /  Alb  1.7<L>  /  TBili  14.0<H>  /  DBili  x   /  AST  791<H>  /  ALT  78<H>  /  AlkPhos  186<H>      PT/INR - ( 10 May 2017 17:05 )   PT: 33.9 sec;   INR: 2.99          PTT - ( 11 May 2017 06:04 )  PTT:50.5 sec  Urinalysis Basic - ( 11 May 2017 02:15 )    Color: Yellow / Appearance: Clear / S.020 / pH: x  Gluc: x / Ketone: NEGATIVE  / Bili: Moderate / Urobili: 0.2 E.U./dL   Blood: x / Protein: NEGATIVE mg/dL / Nitrite: NEGATIVE   Leuk Esterase: NEGATIVE / RBC: 5-10 /HPF / WBC < 5 /HPF   Sq Epi: x / Non Sq Epi: Rare /HPF / Bacteria: Many /HPF    Imaging:  Reviewed    PEx:  T(C): 36.6, Max: 36.6 (05-11 @ 13:25)  HR: 105 (102 - 106)  BP: 129/70 (119/52 - 129/70)  RR: 16 (16 - 18)  SpO2: 99% (99% - 100%)  Wt(kg): --82.6  Daily     Daily   CAPILLARY BLOOD GLUCOSE  288 (11 May 2017 11:16)    I&O's Summary  I & Os for 24h ending 11 May 2017 07:00  =============================================  IN: 0 ml / OUT: 1050 ml / NET: -1050 ml    I & Os for current day (as of 11 May 2017 17:06)  =============================================  IN: 1340 ml / OUT: 450 ml / NET: 890 ml      General: aggitated male in bed, wanting to go home  HEENT:  A/T, N/C scleral  icterus  Neck: supple, no JVD  CVS: S1S2 3/6 systolic murmur at sternal border, tachycardic  Resp: B/L basilar crackles, poor respiratory effort  GI:  + signicant ascites, dull to precussion, + R/G on light palpation, + reproducible hernia  : voiding freely   Musc:  + weakness LYNCH with equal strength   Neuro: A&O x 3 no focal deficits  Psych: aggitated and argumentative    Skin:jaundice, no visible rashes  Lymph:NO LAD appreciated  Preadmit Karnofsky: 30 %           Current Karnofsky: 30 %  Cachexia (Y/N): N      Advanced Directives:     Full Code      Decision maker: Kortney Lindsay  Legal surrogate: Molly Sheets (wife): [phone]    Social History: , 3 children, worked in food services until 2 weeks ago, on home hospice in [place]    GOALS OF CARE DISCUSSION       Palliative care info/counseling provided	           Documentation of GOC: to return home and reinstate hospice services	          REFERRALS	        Palliative Med        Unit SW/Case Mgmt

## 2017-05-15 DIAGNOSIS — E87.2 ACIDOSIS: ICD-10-CM

## 2017-05-15 DIAGNOSIS — C22.0 LIVER CELL CARCINOMA: ICD-10-CM

## 2017-05-15 DIAGNOSIS — K72.90 HEPATIC FAILURE, UNSPECIFIED WITHOUT COMA: ICD-10-CM

## 2017-05-15 DIAGNOSIS — K64.9 UNSPECIFIED HEMORRHOIDS: ICD-10-CM

## 2017-05-15 DIAGNOSIS — N17.9 ACUTE KIDNEY FAILURE, UNSPECIFIED: ICD-10-CM

## 2017-05-15 DIAGNOSIS — K76.7 HEPATORENAL SYNDROME: ICD-10-CM

## 2017-05-15 DIAGNOSIS — D63.8 ANEMIA IN OTHER CHRONIC DISEASES CLASSIFIED ELSEWHERE: ICD-10-CM

## 2017-05-15 DIAGNOSIS — A41.9 SEPSIS, UNSPECIFIED ORGANISM: ICD-10-CM

## 2017-05-15 DIAGNOSIS — R65.20 SEVERE SEPSIS WITHOUT SEPTIC SHOCK: ICD-10-CM

## 2017-05-15 DIAGNOSIS — E87.5 HYPERKALEMIA: ICD-10-CM

## 2017-05-15 DIAGNOSIS — E87.1 HYPO-OSMOLALITY AND HYPONATREMIA: ICD-10-CM

## 2017-05-15 DIAGNOSIS — B19.20 UNSPECIFIED VIRAL HEPATITIS C WITHOUT HEPATIC COMA: ICD-10-CM

## 2017-05-15 DIAGNOSIS — D69.6 THROMBOCYTOPENIA, UNSPECIFIED: ICD-10-CM

## 2017-05-15 DIAGNOSIS — R18.8 OTHER ASCITES: ICD-10-CM

## 2017-05-15 DIAGNOSIS — E11.8 TYPE 2 DIABETES MELLITUS WITH UNSPECIFIED COMPLICATIONS: ICD-10-CM

## 2017-05-15 DIAGNOSIS — K74.69 OTHER CIRRHOSIS OF LIVER: ICD-10-CM

## 2017-05-15 DIAGNOSIS — Z79.4 LONG TERM (CURRENT) USE OF INSULIN: ICD-10-CM

## 2017-05-15 DIAGNOSIS — K62.5 HEMORRHAGE OF ANUS AND RECTUM: ICD-10-CM

## 2017-05-15 DIAGNOSIS — Z51.5 ENCOUNTER FOR PALLIATIVE CARE: ICD-10-CM

## 2017-05-15 LAB
CULTURE RESULTS: SIGNIFICANT CHANGE UP
CULTURE RESULTS: SIGNIFICANT CHANGE UP
SPECIMEN SOURCE: SIGNIFICANT CHANGE UP
SPECIMEN SOURCE: SIGNIFICANT CHANGE UP

## 2018-02-06 NOTE — ED ADULT NURSE NOTE - NS TRANSFER PATIENT BELONGINGS
Medicare Wellness Visit  Plan for Preventive Care    A good way for you to stay healthy is to use preventive care. Medicare covers many services that can help you stay healthy. * The goal of these services is to find any health problems as quickly as possible. Finding problems early can help make them easier to treat. Your personal plan below lists the services you may need and when they are due. Health Maintenance Summary     Topic Due On Due Status Completed On    Colorectal Cancer Screening - Colonoscopy Jun 16, 2016 Overdue     IMMUNIZATION - DTaP/Tdap/Td Apr 1, 2010 Overdue Mar 31, 2010    Kanika Ferris  Completed Oct 16, 2017           Preventive Care for Women and Men    Heart Screenings (Cardiovascular):  Â· Blood tests are used to check your cholesterol, lipid and triglyceride levels. High levels can increase your risk for heart disease and stroke. High levels can be treated with medications, diet and exercise. Lowering your levels can help keep your heart and blood vessels healthy. Your provider will order these tests if they are needed. Â· An ultrasound is done to see if you have an abdominal aortic aneurysm (AAA). This is an enlargement of one of the main blood vessels that delivers blood to the body. In the [facility], 9,000 deaths are caused by AAA. You may not even know you have this problem and as many as 1 in 3 people will have a serious problem if it is not treated. Early diagnosis allows for more effective treatment and cure. If you have a family history of AAA or are a male age 70-76 who has smoked, you are at higher risk of an AAA. Your provider can order this test, if needed. Colorectal Screening:  Â· There are many tests that are used to check for cancer of your colon and rectum. You and your provider should discuss what test is best for you and when to have it done.   Options include:  Â· Screening Colonoscopy: exam of the entire colon, seen through a flexible lighted tube. Â· Flexible Sigmoidoscopy: exam of the last third (sigmoid portion) of the colon and rectum, seen through a flexible lighted tube. Â· Cologuard DNA stool test: a sample of your stool is used to screen for cancer and unseen blood in your stool. Â· Fecal Occult Blood Test: a sample of your stool is studied to find any unseen blood    Flu Shot:  Â· An immunization that helps to prevent influenza (the flu). You should get this every year. The best time to get the shot is in the fall. Pneumococcal Shot:  â¢ Vaccines are available that can help prevent pneumococcal disease, which is any type of infection caused by Streptococcus pneumoniae bacteria. Their use can prevent some cases of pneumonia, meningitis, and sepsis. There are two types of pneumococcal vaccines:   o Conjugate vaccines (PCV-13 or Prevnar 13Â®) - helps protect against the 13 types of pneumococcal bacteria that are the most common causes of serious infections in children and adults. o Polysaccharide vaccine (PPSV23 or Eedyxdzsx62Â®) - helps protect against 23 types of pneumococcal bacteria for patients who are recommended to get it. These vaccines should be given at least 12 months apart. A booster is usually not needed. Hepatitis B Shot:  Â· An immunization that helps to protect people from getting Hepatitis B. Hepatitis B is a virus that spreads through contact with infected blood or body fluids. Many people with the virus do not have symptoms. The virus can lead to serious problems, such as liver disease. Some people are at higher risk than others. Your doctor will tell you if you need this shot. Diabetes Screening:  Â· A test to measure sugar (glucose) in your blood is called a fasting blood sugar. Fasting means you cannot have food or drink for at least 8 hours before the test. This test can detect diabetes long before you may notice symptoms. Glaucoma Screening:  Â· Glaucoma screening is performed by your eye doctor.  The test measures the fluid pressure inside your eyes to determine if you have glaucoma. Hepatitis C Screening:  Â· A blood test to see if you have the hepatitis C virus. Hepatitis C attacks the liver and is a major cause of chronic liver disease. Medicare will cover a single screening for all adults born between [facility], or high risk patients (people who have injected illegal drugs or people who have had blood transfusions). High risk patients who continue to inject illegal drugs can be screened for Hepatitis C every year. Smoking and Tobacco-Use Cessation Counseling:  Â· Tobacco is the single greatest cause of disease and early death in our country today. Medication and counseling together can increase a personâs chance of quitting for good. Â· Medicare covers two quitting attempts per year, with four counseling sessions per attempt (eight sessions in a 12 month period)    Preventive Screening tests for Women    Screening Mammograms and Breast Exams:  Â· An x-ray of your breasts to check for breast cancer before you or your doctor may be able to feel it. If breast cancer is found early it can usually be treated with success. Pelvic Exams and Pap Tests:  Â· An exam to check for cervical and vaginal cancer. A Pap test is a lab test in which cells are taken from your cervix and sent to the lab to look for signs of cervical cancer. If cancer of the cervix is found early, chances for a cure are good. Testing can generally end at age 72, or if a woman has a hysterectomy for a benign condition. Your provider may recommend more frequent testing if certain abnormal results are found. Bone Mass Measurements:  Â· A painless x-ray of your bone density to see if you are at risk for a broken bone. Bone density refers to the thickness of bones or how tightly the bone tissue is packed.     Preventive Screening tests for Men    Prostate Screening:  Â· PSA - Prostate Cancer blood test.  Experts do not recommend routine screening of healthy men with no signs or symptoms of prostate disease. However, men should not ignore urinary symptoms, and should discuss their family history with their doctor. *Medicare pays for many preventive services to keep you healthy. For some of these services, you might have to pay a deductible, coinsurance, and / or copayment. The amounts vary depending on the type of services you need and the kind of Medicare health plan you have. Clothing

## 2022-11-14 NOTE — CONSULT NOTE ADULT - GASTROINTESTINAL DETAILS
Wound referral noted for BUE wounds. Chart reviewed, spoke with RN. Pt has hx of IV drug use. Has already been seen by plastics, ID. Defer wound care to plastics recs-vashe soaked kerlix twice daily. He is to follow up in 2 weeks for possible debridement and skin graft. Please re-consult if needed.  Crystal Hooper, MSN, RN, [employer] distended, tympanic to percussion/no rigidity/no rebound tenderness/no guarding/nontender/soft

## 2023-10-10 NOTE — ED ADULT TRIAGE NOTE - ADDITIONAL SAFETY/BANDS...
"Please relay lab result from Claudette, okay to relay     \"Please let family know that overall labs look normal liver function is also improving\"  "
Additional Safety/Bands:
